# Patient Record
Sex: FEMALE | Race: AMERICAN INDIAN OR ALASKA NATIVE | NOT HISPANIC OR LATINO | Employment: FULL TIME | URBAN - METROPOLITAN AREA
[De-identification: names, ages, dates, MRNs, and addresses within clinical notes are randomized per-mention and may not be internally consistent; named-entity substitution may affect disease eponyms.]

---

## 2018-06-14 ENCOUNTER — OFFICE VISIT (OUTPATIENT)
Dept: OBSTETRICS AND GYNECOLOGY | Facility: CLINIC | Age: 63
End: 2018-06-14
Attending: OBSTETRICS & GYNECOLOGY
Payer: COMMERCIAL

## 2018-06-14 VITALS
WEIGHT: 117.19 LBS | HEIGHT: 60 IN | SYSTOLIC BLOOD PRESSURE: 154 MMHG | BODY MASS INDEX: 23.01 KG/M2 | DIASTOLIC BLOOD PRESSURE: 78 MMHG

## 2018-06-14 DIAGNOSIS — Z11.51 SCREENING FOR HPV (HUMAN PAPILLOMAVIRUS): ICD-10-CM

## 2018-06-14 DIAGNOSIS — Z01.419 ENCOUNTER FOR GYNECOLOGICAL EXAMINATION: Primary | ICD-10-CM

## 2018-06-14 DIAGNOSIS — Z12.4 ENCOUNTER FOR PAPANICOLAOU SMEAR FOR CERVICAL CANCER SCREENING: ICD-10-CM

## 2018-06-14 PROCEDURE — 99386 PREV VISIT NEW AGE 40-64: CPT | Mod: S$GLB,,, | Performed by: OBSTETRICS & GYNECOLOGY

## 2018-06-14 PROCEDURE — 99999 PR PBB SHADOW E&M-EST. PATIENT-LVL III: CPT | Mod: PBBFAC,,, | Performed by: OBSTETRICS & GYNECOLOGY

## 2018-06-14 PROCEDURE — 87624 HPV HI-RISK TYP POOLED RSLT: CPT

## 2018-06-14 PROCEDURE — 88175 CYTOPATH C/V AUTO FLUID REDO: CPT

## 2018-06-14 RX ORDER — IBUPROFEN 100 MG/5ML
1000 SUSPENSION, ORAL (FINAL DOSE FORM) ORAL DAILY
COMMUNITY
End: 2023-11-27

## 2018-06-14 RX ORDER — ERGOCALCIFEROL 1.25 MG/1
50000 CAPSULE ORAL
COMMUNITY
End: 2021-11-12

## 2018-06-14 RX ORDER — ZINC GLUCONATE 50 MG
50 TABLET ORAL DAILY
COMMUNITY
End: 2023-11-27

## 2018-06-14 RX ORDER — FERROUS GLUCONATE 324(38)MG
324 TABLET ORAL
COMMUNITY
End: 2021-11-12

## 2018-06-14 NOTE — PROGRESS NOTES
Subjective:       Patient ID: Watson Castro is a 62 y.o. female.    Chief Complaint:  Well Woman (pap 10+yrs ago mammo 2018 BMD ordered by PCP)      History of Present Illness  - here for annual. No complaints. Denies pain/vaginal bleeding.    Past Medical History:   Diagnosis Date    Iron deficiency anemia     Vitamin D deficiency        History reviewed. No pertinent surgical history.      Current Outpatient Prescriptions:     ascorbic acid, vitamin C, (VITAMIN C) 1000 MG tablet, Take 1,000 mg by mouth once daily., Disp: , Rfl:     ergocalciferol (VITAMIN D2) 50,000 unit Cap, Take 50,000 Units by mouth every 7 days., Disp: , Rfl:     ferrous gluconate (FERGON) 324 MG tablet, Take 324 mg by mouth daily with breakfast., Disp: , Rfl:     Lactobac no.41/Bifidobact no.7 (PROBIOTIC-10 ORAL), Take by mouth., Disp: , Rfl:     zinc gluconate 50 mg tablet, Take 50 mg by mouth once daily., Disp: , Rfl:     Review of patient's allergies indicates:   Allergen Reactions    Sulfa (sulfonamide antibiotics) Rash       GYN & OB History  No LMP recorded. Patient is postmenopausal.   Date of Last Pap: No result found    OB History    Para Term  AB Living   2 2 2     2   SAB TAB Ectopic Multiple Live Births           2      # Outcome Date GA Lbr Dario/2nd Weight Sex Delivery Anes PTL Lv   2 Term      Vag-Spont   CELESTINE   1 Term      Vag-Spont   CELESTINE          Social History     Social History    Marital status:      Spouse name: N/A    Number of children: N/A    Years of education: N/A     Occupational History    Not on file.     Social History Main Topics    Smoking status: Never Smoker    Smokeless tobacco: Never Used    Alcohol use No    Drug use: No    Sexual activity: Yes     Other Topics Concern    Not on file     Social History Narrative    No narrative on file       Family History   Problem Relation Age of Onset    Family history unknown: Yes       Review of Systems  Review of Systems    Respiratory: Negative for shortness of breath.    Cardiovascular: Negative for chest pain and palpitations.   Gastrointestinal: Negative for blood in stool, nausea and vomiting.   Genitourinary:        - see HPI   Skin: Negative for rash and wound.   Allergic/Immunologic: Negative for immunocompromised state.   Neurological: Negative for dizziness and syncope.   Hematological: Negative for adenopathy.   Psychiatric/Behavioral: Negative for behavioral problems.        Objective:     Vitals:    06/14/18 1306   BP: (!) 154/78   Weight: 53.1 kg (117 lb 2.8 oz)   Height: 5' (1.524 m)       Physical Exam:   Constitutional: She is oriented to person, place, and time. She appears well-developed and well-nourished.        Pulmonary/Chest: Right breast exhibits no mass, no nipple discharge, no skin change, no tenderness and no swelling. Left breast exhibits no mass, no nipple discharge, no skin change, no tenderness and no swelling. Breasts are symmetrical.       Scarring of right breast c/w old healed bug bite (per patient no change)        Abdominal: Soft. She exhibits no distension. There is no tenderness.     Genitourinary: Vagina normal and uterus normal. There is no tenderness or lesion on the right labia. There is no tenderness or lesion on the left labia. Cervix is normal. Right adnexum displays no mass, no tenderness and no fullness. Left adnexum displays no mass, no tenderness and no fullness. No vaginal discharge found. Additional cervical findings: pap smear done          Musculoskeletal: Moves all extremeties.       Neurological: She is alert and oriented to person, place, and time.     Psychiatric: She has a normal mood and affect.        Assessment/ Plan:     Orders Placed This Encounter    HPV High Risk Genotypes, PCR    Liquid-based pap smear, screening       Watson was seen today for well woman.    Diagnoses and all orders for this visit:    Encounter for gynecological examination    Encounter for  Papanicolaou smear for cervical cancer screening  -     Liquid-based pap smear, screening    Screening for HPV (human papillomavirus)  -     HPV High Risk Genotypes, PCR        Follow-up in about 1 year (around 6/14/2019) for annual exam.

## 2018-06-20 LAB
HPV HR 12 DNA CVX QL NAA+PROBE: NEGATIVE
HPV16 AG SPEC QL: NEGATIVE
HPV18 DNA SPEC QL NAA+PROBE: NEGATIVE

## 2020-11-06 PROBLEM — Z00.00 WELL ADULT EXAM: Status: ACTIVE | Noted: 2020-11-06

## 2021-02-08 PROBLEM — Z00.00 WELL ADULT EXAM: Status: RESOLVED | Noted: 2020-11-06 | Resolved: 2021-02-08

## 2021-11-12 PROBLEM — E78.1 HYPERTRIGLYCERIDEMIA: Status: ACTIVE | Noted: 2021-11-12

## 2021-11-12 PROBLEM — Z00.00 ANNUAL PHYSICAL EXAM: Status: ACTIVE | Noted: 2021-11-12

## 2022-02-14 PROBLEM — Z00.00 ANNUAL PHYSICAL EXAM: Status: RESOLVED | Noted: 2021-11-12 | Resolved: 2022-02-14

## 2022-06-01 ENCOUNTER — TELEPHONE (OUTPATIENT)
Dept: OPHTHALMOLOGY | Facility: CLINIC | Age: 67
End: 2022-06-01
Payer: COMMERCIAL

## 2022-06-01 NOTE — TELEPHONE ENCOUNTER
----- Message from Alpa Shea sent at 6/1/2022 11:59 AM CDT -----  Regarding: Appt Inquiry  Patient called to schedule with Dr for a cataract eval, pt would like to see Dr at [facility]. No solution found before the template release date of 10/31/2022. Pt is also requesting dr because her  is a pt of .        Requesting Call back number:[phone]

## 2022-06-01 NOTE — TELEPHONE ENCOUNTER
Cataract eval OU appt scheduled and confirmed with pt 07/11/2022 at 1:30 pm with Dr. Anaya. Appt confirmed for [facility] 10th floor main [place].

## 2022-09-12 ENCOUNTER — OFFICE VISIT (OUTPATIENT)
Dept: OPHTHALMOLOGY | Facility: CLINIC | Age: 67
End: 2022-09-12
Payer: COMMERCIAL

## 2022-09-12 DIAGNOSIS — H52.7 REFRACTIVE ERROR: ICD-10-CM

## 2022-09-12 DIAGNOSIS — H25.13 NUCLEAR SCLEROSIS OF BOTH EYES: Primary | ICD-10-CM

## 2022-09-12 DIAGNOSIS — H43.812 VITREOUS DETACHMENT OF LEFT EYE: ICD-10-CM

## 2022-09-12 PROCEDURE — 99213 OFFICE O/P EST LOW 20 MIN: CPT | Mod: PBBFAC,PO | Performed by: OPHTHALMOLOGY

## 2022-09-12 PROCEDURE — 92004 COMPRE OPH EXAM NEW PT 1/>: CPT | Mod: S$GLB,,, | Performed by: OPHTHALMOLOGY

## 2022-09-12 PROCEDURE — 99999 PR PBB SHADOW E&M-EST. PATIENT-LVL III: CPT | Mod: PBBFAC,,, | Performed by: OPHTHALMOLOGY

## 2022-09-12 PROCEDURE — 92136 OPHTHALMIC BIOMETRY: CPT | Mod: PBBFAC,PO,RT | Performed by: OPHTHALMOLOGY

## 2022-09-12 PROCEDURE — 99999 PR PBB SHADOW E&M-EST. PATIENT-LVL III: ICD-10-PCS | Mod: PBBFAC,,, | Performed by: OPHTHALMOLOGY

## 2022-09-12 PROCEDURE — 92004 PR EYE EXAM, NEW PATIENT,COMPREHESV: ICD-10-PCS | Mod: S$GLB,,, | Performed by: OPHTHALMOLOGY

## 2022-09-12 PROCEDURE — 92136 BIOMETRY: ICD-10-PCS | Mod: RT,S$GLB,, | Performed by: OPHTHALMOLOGY

## 2022-09-12 NOTE — PROGRESS NOTES
Subjective:       Patient ID: Watson Castro is a 67 y.o. female.    Chief Complaint: Cataract (Patient Watson Castro is a 67 year old female.)    HPI     Cataract     Additional comments: Patient Watson Castro is a 67 year old female.           Comments    Pt here for cataract evaluation OU. Pt states that her VA OD>>OS has been   increasingly blurry since NEHAL 1 year ago. Pt states that she was told by   optometrist last year that glasses will not help with VA trouble. Pt   states that she relies on her VA OS in order to see. Pt denies any glare,   pain, flashes, or floaters.    NEHAL: 1 year at [facility]    Gtts: None    POHx: None            Last edited by Delmy Mcdermott on 9/12/2022  8:39 AM.             Assessment:       1. Nuclear sclerosis of both eyes    2. Vitreous detachment of left eye    3. Refractive error          Plan:       Visually significant cataract OU -Pt. Wants Sx.     PVD OS-Stable.  RE      Cataract Surgery Consent: Patient with a visually significant cataract with difficulties of ADLs, reading, driving, night vision, glare (any and all).  Discussed with Patient/Family/Caregiver: options, risks and benefits, expectations of cataract surgery, utilized an eye model with questions and answers to facilitate discussion.  Discussed lens options and patient understands that glasses may be required for optimal vision for distance and/or near vision after cataract surgery.  The Patient/Family/Caregiver  voice good understanding and patient wishes to proceed with surgery.  The patient will likely benefit from surgery and patient signed consent for Right Eye.   Will call Pt to schedule CE OD 1st Clareon 22.0,                                             OS 2nd Clareon 22.5.

## 2022-09-16 ENCOUNTER — TELEPHONE (OUTPATIENT)
Dept: OPHTHALMOLOGY | Facility: CLINIC | Age: 67
End: 2022-09-16
Payer: COMMERCIAL

## 2022-09-16 DIAGNOSIS — H25.11 NS (NUCLEAR SCLEROSIS), RIGHT: Primary | ICD-10-CM

## 2022-09-17 DIAGNOSIS — H25.13 NUCLEAR SCLEROTIC CATARACT OF BOTH EYES: Primary | ICD-10-CM

## 2022-09-17 RX ORDER — KETOROLAC TROMETHAMINE 5 MG/ML
1 SOLUTION OPHTHALMIC 4 TIMES DAILY
Qty: 5 ML | Refills: 1 | Status: SHIPPED | OUTPATIENT
Start: 2022-10-08 | End: 2022-11-07

## 2022-09-17 RX ORDER — OFLOXACIN 3 MG/ML
1 SOLUTION/ DROPS OPHTHALMIC 4 TIMES DAILY
Qty: 5 ML | Refills: 1 | Status: SHIPPED | OUTPATIENT
Start: 2022-10-08 | End: 2022-10-18

## 2022-09-17 RX ORDER — PREDNISOLONE ACETATE 10 MG/ML
1 SUSPENSION/ DROPS OPHTHALMIC 4 TIMES DAILY
Qty: 5 ML | Refills: 1 | Status: SHIPPED | OUTPATIENT
Start: 2022-10-11 | End: 2022-11-10

## 2022-09-20 ENCOUNTER — TELEPHONE (OUTPATIENT)
Dept: OPHTHALMOLOGY | Facility: CLINIC | Age: 67
End: 2022-09-20
Payer: COMMERCIAL

## 2022-09-20 DIAGNOSIS — H25.12 NS (NUCLEAR SCLEROSIS), LEFT: Primary | ICD-10-CM

## 2022-09-21 ENCOUNTER — TELEPHONE (OUTPATIENT)
Dept: OPHTHALMOLOGY | Facility: CLINIC | Age: 67
End: 2022-09-21
Payer: COMMERCIAL

## 2022-10-04 ENCOUNTER — TELEPHONE (OUTPATIENT)
Dept: OPHTHALMOLOGY | Facility: CLINIC | Age: 67
End: 2022-10-04
Payer: COMMERCIAL

## 2022-10-04 NOTE — TELEPHONE ENCOUNTER
----- Message from Britney Aquino sent at 10/3/2022  3:54 PM CDT -----  Regarding: FW: speak with office  Contact: shadi    ----- Message -----  From: Sonia Baltazar  Sent: 10/3/2022   2:54 PM CDT  To: Jaclyn HOWARD Staff  Subject: speak with office                                Pt is calling to speak with alicja..[phone] (work)

## 2022-10-10 NOTE — H&P
[facility]  History & Physical    Subjective:      Chief Complaint/Reason for Admission:     Watson Castro is a 67 y.o. female.    Past Medical History:   Diagnosis Date    Iron deficiency anemia     Vitamin D deficiency      History reviewed. No pertinent surgical history.  Family History   Family history unknown: Yes     Social History     Tobacco Use    Smoking status: Never    Smokeless tobacco: Never   Substance Use Topics    Alcohol use: No     Alcohol/week: 0.0 standard drinks    Drug use: No       No medications prior to admission.     Review of patient's allergies indicates:   Allergen Reactions    Sulfa (sulfonamide antibiotics) Rash        Review of Systems   Eyes:  Positive for blurred vision.   All other systems reviewed and are negative.    Objective:      Vital Signs (Most Recent)       Vital Signs Range (Last 24H):       Physical Exam  Constitutional:       Appearance: She is well-developed.   HENT:      Head: Normocephalic.   Eyes:      Conjunctiva/sclera: Conjunctivae normal.      Pupils: Pupils are equal, round, and reactive to light.   Cardiovascular:      Rate and Rhythm: Normal rate and regular rhythm.      Heart sounds: Normal heart sounds.   Pulmonary:      Effort: Pulmonary effort is normal.      Breath sounds: Normal breath sounds.   Abdominal:      General: Bowel sounds are normal.      Palpations: Abdomen is soft.   Musculoskeletal:         General: Normal range of motion.      Cervical back: Normal range of motion and neck supple.   Skin:     General: Skin is warm.   Neurological:      Mental Status: She is alert and oriented to person, place, and time.       Data Review:     ECG:     Assessment:      Cataract OD.    Plan:    CE OD.

## 2022-10-11 ENCOUNTER — ANESTHESIA EVENT (OUTPATIENT)
Dept: SURGERY | Facility: OTHER | Age: 67
End: 2022-10-11

## 2022-10-11 ENCOUNTER — HOSPITAL ENCOUNTER (OUTPATIENT)
Facility: OTHER | Age: 67
Discharge: HOME | End: 2022-10-11
Attending: OPHTHALMOLOGY | Admitting: OPHTHALMOLOGY
Payer: COMMERCIAL

## 2022-10-11 ENCOUNTER — ANESTHESIA (OUTPATIENT)
Dept: SURGERY | Facility: OTHER | Age: 67
End: 2022-10-11

## 2022-10-11 VITALS
RESPIRATION RATE: 16 BRPM | BODY MASS INDEX: 22.58 KG/M2 | TEMPERATURE: 99 F | SYSTOLIC BLOOD PRESSURE: 113 MMHG | DIASTOLIC BLOOD PRESSURE: 72 MMHG | OXYGEN SATURATION: 99 % | HEIGHT: 60 IN | WEIGHT: 115 LBS | HEART RATE: 89 BPM

## 2022-10-11 DIAGNOSIS — H25.11 NUCLEAR SCLEROTIC CATARACT OF RIGHT EYE: Primary | ICD-10-CM

## 2022-10-11 PROCEDURE — 36000706: Performed by: OPHTHALMOLOGY

## 2022-10-11 PROCEDURE — 25000003 PHARM REV CODE 250: Performed by: OPHTHALMOLOGY

## 2022-10-11 PROCEDURE — 36000707: Performed by: OPHTHALMOLOGY

## 2022-10-11 PROCEDURE — 37000008 HC ANESTHESIA 1ST 15 MINUTES: Performed by: OPHTHALMOLOGY

## 2022-10-11 PROCEDURE — 66984 XCAPSL CTRC RMVL W/O ECP: CPT | Mod: RT,,, | Performed by: OPHTHALMOLOGY

## 2022-10-11 PROCEDURE — 66984 PR REMOVAL, CATARACT, W/INSRT INTRAOC LENS, W/O ENDO CYCLO: ICD-10-PCS | Mod: RT,,, | Performed by: OPHTHALMOLOGY

## 2022-10-11 PROCEDURE — 71000015 HC POSTOP RECOV 1ST HR: Performed by: OPHTHALMOLOGY

## 2022-10-11 PROCEDURE — 63600175 PHARM REV CODE 636 W HCPCS: Performed by: NURSE ANESTHETIST, CERTIFIED REGISTERED

## 2022-10-11 PROCEDURE — 37000009 HC ANESTHESIA EA ADD 15 MINS: Performed by: OPHTHALMOLOGY

## 2022-10-11 PROCEDURE — 63600175 PHARM REV CODE 636 W HCPCS: Performed by: OPHTHALMOLOGY

## 2022-10-11 PROCEDURE — V2632 POST CHMBR INTRAOCULAR LENS: HCPCS | Performed by: OPHTHALMOLOGY

## 2022-10-11 DEVICE — LENS CLAREON AUTONOME 22.0D: Type: IMPLANTABLE DEVICE | Site: EYE | Status: FUNCTIONAL

## 2022-10-11 RX ORDER — PHENYLEPHRINE HYDROCHLORIDE 25 MG/ML
1 SOLUTION/ DROPS OPHTHALMIC
Status: COMPLETED | OUTPATIENT
Start: 2022-10-11 | End: 2022-10-11

## 2022-10-11 RX ORDER — OFLOXACIN 3 MG/ML
1 SOLUTION/ DROPS OPHTHALMIC
Status: DISCONTINUED | OUTPATIENT
Start: 2022-10-11 | End: 2022-10-11 | Stop reason: HOSPADM

## 2022-10-11 RX ORDER — TETRACAINE HYDROCHLORIDE 5 MG/ML
1 SOLUTION OPHTHALMIC
Status: DISCONTINUED | OUTPATIENT
Start: 2022-10-11 | End: 2022-10-11 | Stop reason: HOSPADM

## 2022-10-11 RX ORDER — SODIUM CHLORIDE 0.9 % (FLUSH) 0.9 %
10 SYRINGE (ML) INJECTION
Status: DISCONTINUED | OUTPATIENT
Start: 2022-10-11 | End: 2022-10-11 | Stop reason: HOSPADM

## 2022-10-11 RX ORDER — TETRACAINE HYDROCHLORIDE 5 MG/ML
SOLUTION OPHTHALMIC
Status: DISCONTINUED | OUTPATIENT
Start: 2022-10-11 | End: 2022-10-11 | Stop reason: HOSPADM

## 2022-10-11 RX ORDER — LIDOCAINE HYDROCHLORIDE 40 MG/ML
INJECTION, SOLUTION RETROBULBAR
Status: DISCONTINUED | OUTPATIENT
Start: 2022-10-11 | End: 2022-10-11 | Stop reason: HOSPADM

## 2022-10-11 RX ORDER — HYDROCODONE BITARTRATE AND ACETAMINOPHEN 5; 325 MG/1; MG/1
1 TABLET ORAL EVERY 4 HOURS PRN
Status: DISCONTINUED | OUTPATIENT
Start: 2022-10-11 | End: 2022-10-11 | Stop reason: HOSPADM

## 2022-10-11 RX ORDER — EPINEPHRINE 1 MG/ML
INJECTION, SOLUTION INTRACARDIAC; INTRAMUSCULAR; INTRAVENOUS; SUBCUTANEOUS
Status: DISCONTINUED | OUTPATIENT
Start: 2022-10-11 | End: 2022-10-11 | Stop reason: HOSPADM

## 2022-10-11 RX ORDER — TROPICAMIDE 10 MG/ML
1 SOLUTION/ DROPS OPHTHALMIC
Status: COMPLETED | OUTPATIENT
Start: 2022-10-11 | End: 2022-10-11

## 2022-10-11 RX ORDER — ACETAMINOPHEN 325 MG/1
650 TABLET ORAL EVERY 4 HOURS PRN
Status: DISCONTINUED | OUTPATIENT
Start: 2022-10-11 | End: 2022-10-11 | Stop reason: HOSPADM

## 2022-10-11 RX ORDER — CYCLOPENTOLATE HYDROCHLORIDE 10 MG/ML
1 SOLUTION/ DROPS OPHTHALMIC
Status: DISCONTINUED | OUTPATIENT
Start: 2022-10-11 | End: 2022-10-11 | Stop reason: HOSPADM

## 2022-10-11 RX ORDER — MIDAZOLAM HYDROCHLORIDE 1 MG/ML
INJECTION INTRAMUSCULAR; INTRAVENOUS
Status: DISCONTINUED | OUTPATIENT
Start: 2022-10-11 | End: 2022-10-11

## 2022-10-11 RX ORDER — PREDNISOLONE ACETATE 10 MG/ML
SUSPENSION/ DROPS OPHTHALMIC
Status: DISCONTINUED | OUTPATIENT
Start: 2022-10-11 | End: 2022-10-11 | Stop reason: HOSPADM

## 2022-10-11 RX ADMIN — TROPICAMIDE 1 DROP: 10 SOLUTION/ DROPS OPHTHALMIC at 07:10

## 2022-10-11 RX ADMIN — PHENYLEPHRINE HYDROCHLORIDE 1 DROP: 25 SOLUTION/ DROPS OPHTHALMIC at 07:10

## 2022-10-11 RX ADMIN — MIDAZOLAM HYDROCHLORIDE 2 MG: 1 INJECTION, SOLUTION INTRAMUSCULAR; INTRAVENOUS at 10:10

## 2022-10-11 RX ADMIN — OFLOXACIN 1 DROP: 3 SOLUTION OPHTHALMIC at 07:10

## 2022-10-11 RX ADMIN — PHENYLEPHRINE HYDROCHLORIDE 1 DROP: 25 SOLUTION/ DROPS OPHTHALMIC at 08:10

## 2022-10-11 RX ADMIN — TETRACAINE HYDROCHLORIDE 1 DROP: 5 SOLUTION OPHTHALMIC at 08:10

## 2022-10-11 RX ADMIN — CYCLOPENTOLATE HYDROCHLORIDE 1 DROP: 10 SOLUTION/ DROPS OPHTHALMIC at 07:10

## 2022-10-11 RX ADMIN — CYCLOPENTOLATE HYDROCHLORIDE 1 DROP: 10 SOLUTION/ DROPS OPHTHALMIC at 08:10

## 2022-10-11 RX ADMIN — OFLOXACIN 1 DROP: 3 SOLUTION OPHTHALMIC at 08:10

## 2022-10-11 NOTE — DISCHARGE INSTRUCTIONS
Dr. Anaya     Post op Cataract instructions:     Relax at home do not exert yourself for the rest of the day.   Resume taking all your medications after surgery.   Do NOT rub your eye   Do not allow water into the eye and do not wear eye make up for one week after surgery.   Wear eye shield at bedtime for 1 week.   You may take an over-the-counter pain medication such as Tylenol or Advil as needed for pain.   Mild discomfort is normal. However, if you have severe throbbing pain, loss of vision, onset of flashes and/or floaters call Dr. Anaya at   [phone] or proceed to the Emergency room   You may resume driving the day after your procedure to your postop appointment if your vision is clear. Do not wear your eye shield while driving.   Plan to see Dr. Anaya tomorrow at his office :     [address] [facility].   [phone]     Day of surgery:   Start your eye drops in the operative eye as soon as you get home and continue until Dr. Anaya instructs you otherwise. (remove eye shield to instill drops)   Ofloxacin (tan top) 1 drop in operative eye 3 times a day   Durezol (pink top) 1 drop in operative eye 3 times a day.   Ilevro (gray top) 1 drop in operative eye once a day

## 2022-10-11 NOTE — OP NOTE
Operative Date:  10/11/2022    Discharge Date:  10/11/2022    Discharge Patient Home    Report Title: Operative Note      SURGEON: Shai Anaya MD     ASSISTANT:     PREOPERATIVE DIAGNOSIS: Visually significant NSC cataract,  Right Eye.    POSTOPERATIVE DIAGNOSIS: Visually-significant NSC cataract,  Right Eye.    PROCEDURE PERFORMED: Phacoemulsification of the cataract with posterior chamber intraocular lens Right Eye.    ANESTHESIA: Topical with MAC     COMPLICATIONS: None    ESTIMATED BLOOD LOSS: Minimal    INDICATIONS FOR PROCEDURE:   The patient is a pleasant 67 year old woman with increasing difficulties with activities of daily living secondary to a dense visually significant cataract in the Right Eye.  Discussions have been carried out with this patient concerning the options to surgery, risks, benefits and expectations.  The patient voiced good understanding and wished to proceed with the above procedure.    PROCEDURE IN DETAIL: The patient was brought to the operating room and received topical anesthetic to the eye and then was prepped and draped in the usual sterile fashion.  Using the Henao ring and the guarded ang blade set at 0.37 mm, a partial thickness clear cornea incision was made temporally.  The paracentesis site was made at the twelve o'clock position.  Omidria was injected into the anterior chamber through the paracentesis.  Viscoat was then injected into the anterior chamber.  The eye was then reentered at the primary surgical site with a 2.4 mm keratome followed by continuous capsulotomy, hydrodissection, hydrodelineation and phacoemulsification of the cataract.  Residual cortical material was removed using automated irrigation-aspiration technique.  Healon was injected into the posterior chamber and a Clareon 22.0 diopter lens was placed in the bag without difficulty. Residual viscoelastic was removed using automated irrigation-aspiration technique. The eye was  re-pressurized using BSS solution and both the paracentesis site and the primary surgical site were demonstrated to be watertight at the end of the case with Weck--Miriam manipulation.  One drop of Ofloxacin and one drop of Pred acetate 1% was applied to the Right Eye .The eye was closed, patched and a Wallace shield placed.  The patient was taken to the recovery room in good and stable condition.  The patient tolerated the procedure well.  The patient was instructed to refrain from any heavy lifting, bending, stooping or straining activities, discharged home  and to follow-up in the morning for routine postoperative care with Shai Anaya MD.

## 2022-10-11 NOTE — ANESTHESIA PREPROCEDURE EVALUATION
10/11/2022  Watson Castro is a 67 y.o., female.      Pre-op Assessment    I have reviewed the Patient Summary Reports.     I have reviewed the Nursing Notes. I have reviewed the NPO Status.   I have reviewed the Medications.     Review of Systems  Anesthesia Hx:  No problems with previous Anesthesia  Denies Family Hx of Anesthesia complications.   Denies Personal Hx of Anesthesia complications.   Hematology/Oncology:  Hematology Normal   Oncology Normal     EENT/Dental:EENT/Dental Normal   Cardiovascular:  Cardiovascular Normal     Pulmonary:  Pulmonary Normal    Renal/:  Renal/ Normal     Hepatic/GI:  Hepatic/GI Normal    Musculoskeletal:  Musculoskeletal Normal    Neurological:  Neurology Normal    Endocrine:  Endocrine Normal    Dermatological:  Skin Normal    Psych:  Psychiatric Normal           Physical Exam  General: Well nourished and Alert    Airway:  Mallampati: II   Mouth Opening: Normal  Tongue: Normal    Dental:  Intact        Anesthesia Plan  Type of Anesthesia, risks & benefits discussed:    Anesthesia Type: MAC  Intra-op Monitoring Plan: Standard ASA Monitors  Informed Consent: Informed consent signed with the Patient and all parties understand the risks and agree with anesthesia plan.  All questions answered.   ASA Score: 2    Ready For Surgery From Anesthesia Perspective.     .

## 2022-10-11 NOTE — BRIEF OP NOTE
[facility] - Surgery ([place])  Brief Operative Note    Surgery Date: 10/11/2022     Surgeon(s) and Role:     * Shai Anaya MD - Primary    Assisting Surgeon: None    Pre-op Diagnosis:  NS (nuclear sclerosis), right [H25.11]    Post-op Diagnosis:  Post-Op Diagnosis Codes:     * NS (nuclear sclerosis), right [H25.11]    Procedure(s) (LRB):  EXTRACTION, CATARACT, WITH IOL INSERTION (Right)    Anesthesia: Local MAC    Operative Findings:     Estimated Blood Loss: * No values recorded between 10/11/2022 10:18 AM and 10/11/2022 10:58 AM *         Specimens:   Specimen (24h ago, onward)      None              Discharge Note    OUTCOME: Patient tolerated treatment/procedure well without complication and is now ready for discharge.    DISPOSITION: Home or Self Care    FINAL DIAGNOSIS:  Nuclear sclerotic cataract of right eye    FOLLOWUP: In clinic    DISCHARGE INSTRUCTIONS:    Discharge Procedure Orders   Other restrictions (specify):   Order Comments: No heavy lifting or bending for 1 week.

## 2022-10-11 NOTE — ANESTHESIA POSTPROCEDURE EVALUATION
Anesthesia Post Evaluation    Patient: Watson Castro    Procedure(s) Performed: Procedure(s) (LRB):  EXTRACTION, CATARACT, WITH IOL INSERTION (Right)    Final Anesthesia Type: MAC      Patient location during evaluation: [facility]  Patient participation: Yes- Able to Participate  Level of consciousness: awake and alert  Post-procedure vital signs: reviewed and stable  Pain management: adequate  Airway patency: patent    PONV status at discharge: No PONV  Anesthetic complications: no      Cardiovascular status: blood pressure returned to baseline  Respiratory status: unassisted and spontaneous ventilation  Hydration status: euvolemic  Follow-up not needed.          Vitals Value Taken Time   /69 10/11/22 0754   Temp 37 °C (98.6 °F) 10/11/22 0754   Pulse 97 10/11/22 0754   Resp 16 10/11/22 0754   SpO2 97 % 10/11/22 0754         No case tracking events are documented in the log.      Pain/Marlene Score: No data recorded

## 2022-10-11 NOTE — PLAN OF CARE
Watson Nicolase has met all discharge criteria from Phase II. Vital Signs are stable, ambulating  without difficulty. Discharge instructions given, patient verbalized understanding. Discharged from facility via wheelchair in stable condition.

## 2022-10-12 ENCOUNTER — TELEPHONE (OUTPATIENT)
Dept: OPHTHALMOLOGY | Facility: CLINIC | Age: 67
End: 2022-10-12
Payer: COMMERCIAL

## 2022-10-12 ENCOUNTER — OFFICE VISIT (OUTPATIENT)
Dept: OPHTHALMOLOGY | Facility: CLINIC | Age: 67
End: 2022-10-12
Payer: COMMERCIAL

## 2022-10-12 DIAGNOSIS — Z98.890 POST-OPERATIVE STATE: Primary | ICD-10-CM

## 2022-10-12 PROCEDURE — 99024 POSTOP FOLLOW-UP VISIT: CPT | Mod: S$GLB,,, | Performed by: OPHTHALMOLOGY

## 2022-10-12 PROCEDURE — 99999 PR PBB SHADOW E&M-EST. PATIENT-LVL III: ICD-10-PCS | Mod: PBBFAC,,, | Performed by: OPHTHALMOLOGY

## 2022-10-12 PROCEDURE — 99999 PR PBB SHADOW E&M-EST. PATIENT-LVL III: CPT | Mod: PBBFAC,,, | Performed by: OPHTHALMOLOGY

## 2022-10-12 PROCEDURE — 99024 PR POST-OP FOLLOW-UP VISIT: ICD-10-PCS | Mod: S$GLB,,, | Performed by: OPHTHALMOLOGY

## 2022-10-12 NOTE — PROGRESS NOTES
Subjective:       Patient ID: Watson Castro is a 67 y.o. female.    Chief Complaint: Post-op Evaluation (Patient Watson Castro is a 67 year old female.)    HPI     Post-op Evaluation     Additional comments: Patient Watson Castro is a 67 year old female.           Comments    Pt here for 1 day PCIOL OD post-op visit. Pt denies any eye pain or   headache since sx yesterday. Post-op instructions reviewed with pt.    DLS: 09/12/2022 with Dr. Anaya  S/p Phaco w/IOL OD: 10/11/2022  *2nd eye PCIOL OS scheduled 10/25/2022.*    Gtts:  1. Ofloxacin QID OD  2. Pred Forte QID OD  3. Ketorolac QID OD          Last edited by Delmy Mcdermott on 10/12/2022  9:55 AM.             Assessment:       1. Post-operative state          Plan:       S/p CE OD- Doing well.         C PM OD.  RTC 1 wk.

## 2022-10-14 ENCOUNTER — TELEPHONE (OUTPATIENT)
Dept: OPHTHALMOLOGY | Facility: CLINIC | Age: 67
End: 2022-10-14
Payer: COMMERCIAL

## 2022-10-18 ENCOUNTER — TELEPHONE (OUTPATIENT)
Dept: OPHTHALMOLOGY | Facility: CLINIC | Age: 67
End: 2022-10-18
Payer: COMMERCIAL

## 2022-10-19 ENCOUNTER — OFFICE VISIT (OUTPATIENT)
Dept: OPHTHALMOLOGY | Facility: CLINIC | Age: 67
End: 2022-10-19
Payer: COMMERCIAL

## 2022-10-19 DIAGNOSIS — H25.12 NS (NUCLEAR SCLEROSIS), LEFT: ICD-10-CM

## 2022-10-19 DIAGNOSIS — Z98.890 POST-OPERATIVE STATE: Primary | ICD-10-CM

## 2022-10-19 PROCEDURE — 99999 PR PBB SHADOW E&M-EST. PATIENT-LVL III: ICD-10-PCS | Mod: PBBFAC,,, | Performed by: OPHTHALMOLOGY

## 2022-10-19 PROCEDURE — 92136 PR OPHTHAL BIOMETRY,INTRAOC LENS POW CALC: ICD-10-PCS | Mod: 26,LT,S$GLB, | Performed by: OPHTHALMOLOGY

## 2022-10-19 PROCEDURE — 99999 PR PBB SHADOW E&M-EST. PATIENT-LVL III: CPT | Mod: PBBFAC,,, | Performed by: OPHTHALMOLOGY

## 2022-10-19 PROCEDURE — 99024 PR POST-OP FOLLOW-UP VISIT: ICD-10-PCS | Mod: S$GLB,,, | Performed by: OPHTHALMOLOGY

## 2022-10-19 PROCEDURE — 99024 POSTOP FOLLOW-UP VISIT: CPT | Mod: S$GLB,,, | Performed by: OPHTHALMOLOGY

## 2022-10-19 PROCEDURE — 92136 OPHTHALMIC BIOMETRY: CPT | Mod: 26,LT,S$GLB, | Performed by: OPHTHALMOLOGY

## 2022-10-19 NOTE — PROGRESS NOTES
Subjective:       Patient ID: Watson Castro is a 67 y.o. female.    Chief Complaint: Post-op Evaluation (Watson Castro is a 66 y/o female )    HPI     Post-op Evaluation     Additional comments: Watson Castro is a 66 y/o female            Comments    S/p Phaco w/IOL OD: 10/11/2022     Pt here for 1 week PCIOL OD  Pt denies eye pain. Pt denies f/f.  No other complaints at this time.    Pred Forte TID OD   Ketorolac TID OD            Last edited by Britney Aquino on 10/19/2022  9:21 AM.             Assessment:       1. Post-operative state    2. NS (nuclear sclerosis), left          Plan:       S/p CE OD- Doing well.     Visually significant cataract OS -Pt. Wants Sx.       Taper gtts OD.  Cataract Surgery Consent: Patient with a visually significant cataract with difficulties of ADLs, reading, driving, night vision, glare (any and all).  Discussed with Patient/Family/Caregiver: options, risks and benefits, expectations of cataract surgery, utilized an eye model with questions and answers to facilitate discussion.  Discussed lens options and patient understands that glasses may be required for optimal vision for distance and/or near vision after cataract surgery.  The Patient/Family/Caregiver  voice good understanding and patient wishes to proceed with surgery.  The patient will likely benefit from surgery and patient signed consent for Left Eye.   CE OS 10/25/22.

## 2022-10-23 NOTE — H&P
[facility] Surgery ([place])  History & Physical    Subjective:      Chief Complaint/Reason for Admission:     Watson Castro is a 67 y.o. female.    Past Medical History:   Diagnosis Date    Iron deficiency anemia     Vitamin D deficiency      Past Surgical History:   Procedure Laterality Date    CATARACT EXTRACTION W/  INTRAOCULAR LENS IMPLANT Right 10/11/2022    Procedure: EXTRACTION, CATARACT, WITH IOL INSERTION;  Surgeon: Shai Anaya MD;  Location: [facility] OR;  Service: Ophthalmology;  Laterality: Right;     Family History   Family history unknown: Yes     Social History     Tobacco Use    Smoking status: Never    Smokeless tobacco: Never   Substance Use Topics    Alcohol use: No     Alcohol/week: 0.0 standard drinks    Drug use: No       No medications prior to admission.     Review of patient's allergies indicates:   Allergen Reactions    Sulfa (sulfonamide antibiotics) Rash        Review of Systems   Eyes:  Positive for blurred vision.   All other systems reviewed and are negative.    Objective:      Vital Signs (Most Recent)       Vital Signs Range (Last 24H):       Physical Exam  Constitutional:       Appearance: She is well-developed.   HENT:      Head: Normocephalic.   Eyes:      Conjunctiva/sclera: Conjunctivae normal.      Pupils: Pupils are equal, round, and reactive to light.   Cardiovascular:      Rate and Rhythm: Normal rate and regular rhythm.      Heart sounds: Normal heart sounds.   Pulmonary:      Effort: Pulmonary effort is normal.      Breath sounds: Normal breath sounds.   Abdominal:      General: Bowel sounds are normal.      Palpations: Abdomen is soft.   Musculoskeletal:         General: Normal range of motion.      Cervical back: Normal range of motion and neck supple.   Skin:     General: Skin is warm.   Neurological:      Mental Status: She is alert and oriented to person, place, and time.       Data Review:     ECG:    Assessment:      Cataract OS.    Plan:    CE OS.

## 2022-10-25 ENCOUNTER — ANESTHESIA (OUTPATIENT)
Dept: SURGERY | Facility: OTHER | Age: 67
End: 2022-10-25
Payer: COMMERCIAL

## 2022-10-25 ENCOUNTER — ANESTHESIA EVENT (OUTPATIENT)
Dept: SURGERY | Facility: OTHER | Age: 67
End: 2022-10-25
Payer: COMMERCIAL

## 2022-10-25 ENCOUNTER — HOSPITAL ENCOUNTER (OUTPATIENT)
Facility: OTHER | Age: 67
Discharge: HOME | End: 2022-10-25
Attending: OPHTHALMOLOGY | Admitting: OPHTHALMOLOGY
Payer: COMMERCIAL

## 2022-10-25 VITALS
TEMPERATURE: 98 F | WEIGHT: 115 LBS | BODY MASS INDEX: 22.58 KG/M2 | HEIGHT: 60 IN | SYSTOLIC BLOOD PRESSURE: 119 MMHG | OXYGEN SATURATION: 100 % | DIASTOLIC BLOOD PRESSURE: 79 MMHG | HEART RATE: 99 BPM | RESPIRATION RATE: 17 BRPM

## 2022-10-25 DIAGNOSIS — H25.12 NUCLEAR SCLEROTIC CATARACT OF LEFT EYE: Primary | ICD-10-CM

## 2022-10-25 PROCEDURE — 37000009 HC ANESTHESIA EA ADD 15 MINS: Performed by: OPHTHALMOLOGY

## 2022-10-25 PROCEDURE — 66984 XCAPSL CTRC RMVL W/O ECP: CPT | Mod: 79,LT,, | Performed by: OPHTHALMOLOGY

## 2022-10-25 PROCEDURE — 63600175 PHARM REV CODE 636 W HCPCS: Performed by: OPHTHALMOLOGY

## 2022-10-25 PROCEDURE — 36000707: Performed by: OPHTHALMOLOGY

## 2022-10-25 PROCEDURE — 37000008 HC ANESTHESIA 1ST 15 MINUTES: Performed by: OPHTHALMOLOGY

## 2022-10-25 PROCEDURE — 63600175 PHARM REV CODE 636 W HCPCS: Performed by: NURSE ANESTHETIST, CERTIFIED REGISTERED

## 2022-10-25 PROCEDURE — 71000016 HC POSTOP RECOV ADDL HR: Performed by: OPHTHALMOLOGY

## 2022-10-25 PROCEDURE — 25000003 PHARM REV CODE 250: Performed by: OPHTHALMOLOGY

## 2022-10-25 PROCEDURE — 71000015 HC POSTOP RECOV 1ST HR: Performed by: OPHTHALMOLOGY

## 2022-10-25 PROCEDURE — 66984 PR REMOVAL, CATARACT, W/INSRT INTRAOC LENS, W/O ENDO CYCLO: ICD-10-PCS | Mod: 79,LT,, | Performed by: OPHTHALMOLOGY

## 2022-10-25 PROCEDURE — 36000706: Performed by: OPHTHALMOLOGY

## 2022-10-25 PROCEDURE — V2632 POST CHMBR INTRAOCULAR LENS: HCPCS | Performed by: OPHTHALMOLOGY

## 2022-10-25 DEVICE — LENS CLAREON AUTONOME 22.5D: Type: IMPLANTABLE DEVICE | Site: EYE | Status: FUNCTIONAL

## 2022-10-25 RX ORDER — TETRACAINE HYDROCHLORIDE 5 MG/ML
1 SOLUTION OPHTHALMIC
Status: COMPLETED | OUTPATIENT
Start: 2022-10-25 | End: 2022-10-25

## 2022-10-25 RX ORDER — OFLOXACIN 3 MG/ML
1 SOLUTION/ DROPS OPHTHALMIC
Status: DISCONTINUED | OUTPATIENT
Start: 2022-10-25 | End: 2022-10-25 | Stop reason: HOSPADM

## 2022-10-25 RX ORDER — ACETAMINOPHEN 325 MG/1
650 TABLET ORAL EVERY 4 HOURS PRN
Status: CANCELLED | OUTPATIENT
Start: 2022-10-25

## 2022-10-25 RX ORDER — CYCLOPENTOLATE HYDROCHLORIDE 10 MG/ML
1 SOLUTION/ DROPS OPHTHALMIC
Status: DISCONTINUED | OUTPATIENT
Start: 2022-10-25 | End: 2022-10-25 | Stop reason: HOSPADM

## 2022-10-25 RX ORDER — HYDROCODONE BITARTRATE AND ACETAMINOPHEN 5; 325 MG/1; MG/1
1 TABLET ORAL EVERY 4 HOURS PRN
Status: CANCELLED | OUTPATIENT
Start: 2022-10-25

## 2022-10-25 RX ORDER — MIDAZOLAM HYDROCHLORIDE 1 MG/ML
INJECTION INTRAMUSCULAR; INTRAVENOUS
Status: DISCONTINUED | OUTPATIENT
Start: 2022-10-25 | End: 2022-10-25

## 2022-10-25 RX ORDER — LIDOCAINE HYDROCHLORIDE 40 MG/ML
INJECTION, SOLUTION RETROBULBAR
Status: DISCONTINUED | OUTPATIENT
Start: 2022-10-25 | End: 2022-10-25 | Stop reason: HOSPADM

## 2022-10-25 RX ORDER — TROPICAMIDE 10 MG/ML
1 SOLUTION/ DROPS OPHTHALMIC
Status: COMPLETED | OUTPATIENT
Start: 2022-10-25 | End: 2022-10-25

## 2022-10-25 RX ORDER — PREDNISOLONE ACETATE 10 MG/ML
SUSPENSION/ DROPS OPHTHALMIC
Status: DISCONTINUED | OUTPATIENT
Start: 2022-10-25 | End: 2022-10-25 | Stop reason: HOSPADM

## 2022-10-25 RX ORDER — SODIUM CHLORIDE 0.9 % (FLUSH) 0.9 %
10 SYRINGE (ML) INJECTION
Status: DISCONTINUED | OUTPATIENT
Start: 2022-10-25 | End: 2022-10-25 | Stop reason: HOSPADM

## 2022-10-25 RX ORDER — EPINEPHRINE 1 MG/ML
INJECTION, SOLUTION, CONCENTRATE INTRAVENOUS
Status: DISCONTINUED | OUTPATIENT
Start: 2022-10-25 | End: 2022-10-25 | Stop reason: HOSPADM

## 2022-10-25 RX ORDER — PHENYLEPHRINE HYDROCHLORIDE 25 MG/ML
1 SOLUTION/ DROPS OPHTHALMIC
Status: COMPLETED | OUTPATIENT
Start: 2022-10-25 | End: 2022-10-25

## 2022-10-25 RX ADMIN — TETRACAINE HYDROCHLORIDE 1 DROP: 5 SOLUTION OPHTHALMIC at 07:10

## 2022-10-25 RX ADMIN — OFLOXACIN 1 DROP: 3 SOLUTION OPHTHALMIC at 07:10

## 2022-10-25 RX ADMIN — TROPICAMIDE 1 DROP: 10 SOLUTION/ DROPS OPHTHALMIC at 07:10

## 2022-10-25 RX ADMIN — PHENYLEPHRINE HYDROCHLORIDE 1 DROP: 25 SOLUTION/ DROPS OPHTHALMIC at 07:10

## 2022-10-25 RX ADMIN — CYCLOPENTOLATE HYDROCHLORIDE 1 DROP: 10 SOLUTION/ DROPS OPHTHALMIC at 07:10

## 2022-10-25 RX ADMIN — MIDAZOLAM HYDROCHLORIDE 2 MG: 1 INJECTION, SOLUTION INTRAMUSCULAR; INTRAVENOUS at 09:10

## 2022-10-25 NOTE — ANESTHESIA PREPROCEDURE EVALUATION
10/25/2022  Watson Castro is a 67 y.o., female.      Pre-op Assessment    I have reviewed the Patient Summary Reports.     I have reviewed the Nursing Notes. I have reviewed the NPO Status.   I have reviewed the Medications.     Review of Systems  Anesthesia Hx:  No problems with previous Anesthesia  Denies Family Hx of Anesthesia complications.   Denies Personal Hx of Anesthesia complications.   Hematology/Oncology:  Hematology Normal   Oncology Normal     EENT/Dental:EENT/Dental Normal   Cardiovascular:  Cardiovascular Normal     Pulmonary:  Pulmonary Normal    Renal/:  Renal/ Normal     Hepatic/GI:  Hepatic/GI Normal    Musculoskeletal:  Musculoskeletal Normal    Neurological:  Neurology Normal    Endocrine:  Endocrine Normal    Dermatological:  Skin Normal    Psych:  Psychiatric Normal           Physical Exam  General: Well nourished and Alert    Airway:  Mallampati: II   Mouth Opening: Normal  Tongue: Normal    Dental:  Intact        Anesthesia Plan  Type of Anesthesia, risks & benefits discussed:    Anesthesia Type: MAC  Intra-op Monitoring Plan: Standard ASA Monitors  Informed Consent: Informed consent signed with the Patient and all parties understand the risks and agree with anesthesia plan.  All questions answered.   ASA Score: 2    Ready For Surgery From Anesthesia Perspective.     .

## 2022-10-25 NOTE — BRIEF OP NOTE
[facility] - Surgery ([place])  Brief Operative Note    Surgery Date: 10/25/2022     Surgeon(s) and Role:     * Shai Anaya MD - Primary    Assisting Surgeon: None    Pre-op Diagnosis:  NS (nuclear sclerosis), left [H25.12]    Post-op Diagnosis:  Post-Op Diagnosis Codes:     * NS (nuclear sclerosis), left [H25.12]    Procedure(s) (LRB):  EXTRACTION, CATARACT, WITH IOL INSERTION (Left)    Anesthesia: Local MAC    Operative Findings:     Estimated Blood Loss: * No values recorded between 10/25/2022  9:10 AM and 10/25/2022  9:41 AM *         Specimens:   Specimen (24h ago, onward)      None              Discharge Note    OUTCOME: Patient tolerated treatment/procedure well without complication and is now ready for discharge.    DISPOSITION: Home or Self Care    FINAL DIAGNOSIS:  Nuclear sclerotic cataract of left eye    FOLLOWUP: In clinic    DISCHARGE INSTRUCTIONS:    Discharge Procedure Orders   Other restrictions (specify):   Order Comments: No heavy lifting or bending for 1 week.

## 2022-10-25 NOTE — ANESTHESIA POSTPROCEDURE EVALUATION
Anesthesia Post Evaluation    Patient: Watson Castro    Procedure(s) Performed: Procedure(s) (LRB):  EXTRACTION, CATARACT, WITH IOL INSERTION (Left)    Final Anesthesia Type: MAC      Patient location during evaluation: [facility]  Patient participation: Yes- Able to Participate  Level of consciousness: awake and alert  Post-procedure vital signs: reviewed and stable  Pain management: adequate  Airway patency: patent    PONV status at discharge: No PONV  Anesthetic complications: no      Cardiovascular status: blood pressure returned to baseline  Respiratory status: unassisted and spontaneous ventilation  Hydration status: euvolemic  Follow-up not needed.          Vitals Value Taken Time   /78 10/25/22 0726   Temp 36.8 °C (98.2 °F) 10/25/22 0726   Pulse 78 10/25/22 0726   Resp 18 10/25/22 0726   SpO2 97 % 10/25/22 0726         No case tracking events are documented in the log.      Pain/Marlene Score: No data recorded

## 2022-10-25 NOTE — OP NOTE
Operative Date:  10/25/2022    Discharge Date:  10/25/2022    Discharge Patient Home    Report Title: Operative Note      SURGEON: Shai Anaya MD     ASSISTANT:     PREOPERATIVE DIAGNOSIS: Visually significant NSC cataract,  Left Eye.    POSTOPERATIVE DIAGNOSIS: Visually-significant NSC cataract,  Left Eye.    PROCEDURE PERFORMED: Phacoemulsification of the cataract with posterior chamber intraocular lens Left Eye.    ANESTHESIA: Topical with MAC     COMPLICATIONS: None    ESTIMATED BLOOD LOSS: Minimal    INDICATIONS FOR PROCEDURE:   The patient is a pleasant 67 year old woman with increasing difficulties with activities of daily living secondary to a dense visually significant cataract in the Left Eye.  Discussions have been carried out with this patient concerning the options to surgery, risks, benefits and expectations.  The patient voiced good understanding and wished to proceed with the above procedure.    PROCEDURE IN DETAIL: The patient was brought to the operating room and received topical anesthetic to the eye and then was prepped and draped in the usual sterile fashion.  Using the Henao ring and the guarded ang blade set at 0.37 mm, a partial thickness clear cornea incision was made temporally.  The paracentesis site was made at the six o'clock position.  Omidria was injected into the anterior chamber through the paracentesis.  Viscoat was then injected into the anterior chamber.  The eye was then reentered at the primary surgical site with a 2.4 mm keratome followed by continuous capsulotomy, hydrodissection, hydrodelineation and phacoemulsification of the cataract.  Residual cortical material was removed using automated irrigation-aspiration technique.  Healon was injected into the posterior chamber and a CNAOTO 22.5 diopter lens was placed in the bag without difficulty. Residual viscoelastic was removed using automated irrigation-aspiration technique. The eye was re-pressurized using  BSS solution and both the paracentesis site and the primary surgical site were demonstrated to be watertight at the end of the case with Weck--Miriam manipulation.  One drop of Ofloxacin and one drop of Pred acetate 1% was applied to the Left Eye .The eye was closed, patched and a Wallace shield placed.  The patient was taken to the recovery room in good and stable condition.  The patient tolerated the procedure well.  The patient was instructed to refrain from any heavy lifting, bending, stooping or straining activities, discharged home  and to follow-up in the morning for routine postoperative care with Shai Anaya MD.

## 2022-10-26 ENCOUNTER — OFFICE VISIT (OUTPATIENT)
Dept: OPHTHALMOLOGY | Facility: CLINIC | Age: 67
End: 2022-10-26
Payer: COMMERCIAL

## 2022-10-26 DIAGNOSIS — Z98.890 POST-OPERATIVE STATE: Primary | ICD-10-CM

## 2022-10-26 PROCEDURE — 99024 POSTOP FOLLOW-UP VISIT: CPT | Mod: S$GLB,,, | Performed by: OPHTHALMOLOGY

## 2022-10-26 PROCEDURE — 99999 PR PBB SHADOW E&M-EST. PATIENT-LVL II: ICD-10-PCS | Mod: PBBFAC,,, | Performed by: OPHTHALMOLOGY

## 2022-10-26 PROCEDURE — 99024 PR POST-OP FOLLOW-UP VISIT: ICD-10-PCS | Mod: S$GLB,,, | Performed by: OPHTHALMOLOGY

## 2022-10-26 PROCEDURE — 99999 PR PBB SHADOW E&M-EST. PATIENT-LVL II: CPT | Mod: PBBFAC,,, | Performed by: OPHTHALMOLOGY

## 2022-10-26 NOTE — PROGRESS NOTES
Subjective:       Patient ID: Watson Castro is a 67 y.o. female.    Chief Complaint: Post-op Evaluation (Watson Castro is a 68 y/o female )    HPI     Post-op Evaluation     Additional comments: Watson Castro is a 68 y/o female            Comments    S/p Phaco w/IOL OD: 10/11/2022 \  S/p Phaco w/IOL OS: 10/25/2022    Pt here for 1 day PCIOL OS   Pt state denies f/f. Pt denies itching, tearing and burning.    gTTS:  Oflox QID OS  Pred QID OS  Keto QID OS          Last edited by Britney Aquino on 10/26/2022  9:48 AM.             Assessment:       1. Post-operative state          Plan:       S/p CE OU- Doing well.       CPM OS.  Taper gtts OD.  RTC 1 wk.

## 2022-10-28 ENCOUNTER — TELEPHONE (OUTPATIENT)
Dept: OPHTHALMOLOGY | Facility: CLINIC | Age: 67
End: 2022-10-28
Payer: COMMERCIAL

## 2022-11-02 ENCOUNTER — OFFICE VISIT (OUTPATIENT)
Dept: OPHTHALMOLOGY | Facility: CLINIC | Age: 67
End: 2022-11-02
Payer: COMMERCIAL

## 2022-11-02 DIAGNOSIS — Z98.890 POST-OPERATIVE STATE: Primary | ICD-10-CM

## 2022-11-02 PROCEDURE — 99999 PR PBB SHADOW E&M-EST. PATIENT-LVL III: CPT | Mod: PBBFAC,,, | Performed by: OPHTHALMOLOGY

## 2022-11-02 PROCEDURE — 99999 PR PBB SHADOW E&M-EST. PATIENT-LVL III: ICD-10-PCS | Mod: PBBFAC,,, | Performed by: OPHTHALMOLOGY

## 2022-11-02 PROCEDURE — 99024 PR POST-OP FOLLOW-UP VISIT: ICD-10-PCS | Mod: S$GLB,,, | Performed by: OPHTHALMOLOGY

## 2022-11-02 PROCEDURE — 99024 POSTOP FOLLOW-UP VISIT: CPT | Mod: S$GLB,,, | Performed by: OPHTHALMOLOGY

## 2022-11-02 NOTE — PROGRESS NOTES
Subjective:       Patient ID: Watson Castro is a 67 y.o. female.    Chief Complaint: Post-op Evaluation    HPI    S/p Phaco w/IOL OD: 10/11/2022   S/p Phaco w/IOL OS: 10/25/2022      67 y.o. female is here for 1 week PO OS. Denies eye pain and   flashes/floaters. No discomfort. No blurred vision at distance. C/o   occasional headaches since the first Cataract surgery taking ibuprofen for   the headaches.     Eye Med's: Pred TID OS                     Ketorolac TID OS   Last edited by NICK Morgan on 11/2/2022 10:25 AM.             Assessment:       1. Post-operative state          Plan:       S/p CE OU- Doing well.       Taper gtts OU.  RTC 4 wks.

## 2022-11-18 ENCOUNTER — APPOINTMENT (OUTPATIENT)
Dept: RADIOLOGY | Facility: OTHER | Age: 67
End: 2022-11-18
Attending: FAMILY MEDICINE
Payer: COMMERCIAL

## 2022-11-18 VITALS — BODY MASS INDEX: 22.59 KG/M2 | HEIGHT: 60 IN | WEIGHT: 115.06 LBS

## 2022-11-18 DIAGNOSIS — Z12.31 BREAST CANCER SCREENING BY MAMMOGRAM: ICD-10-CM

## 2022-11-18 PROCEDURE — 77063 BREAST TOMOSYNTHESIS BI: CPT | Mod: 26,,, | Performed by: RADIOLOGY

## 2022-11-18 PROCEDURE — 77063 BREAST TOMOSYNTHESIS BI: CPT | Mod: TC,PN

## 2022-11-18 PROCEDURE — 77067 MAMMO DIGITAL SCREENING BILAT WITH TOMO: ICD-10-PCS | Mod: 26,,, | Performed by: RADIOLOGY

## 2022-11-18 PROCEDURE — 77063 MAMMO DIGITAL SCREENING BILAT WITH TOMO: ICD-10-PCS | Mod: 26,,, | Performed by: RADIOLOGY

## 2022-11-18 PROCEDURE — 77067 SCR MAMMO BI INCL CAD: CPT | Mod: TC,PN

## 2022-11-18 PROCEDURE — 77067 SCR MAMMO BI INCL CAD: CPT | Mod: 26,,, | Performed by: RADIOLOGY

## 2022-11-28 ENCOUNTER — OFFICE VISIT (OUTPATIENT)
Dept: OPHTHALMOLOGY | Facility: CLINIC | Age: 67
End: 2022-11-28
Payer: COMMERCIAL

## 2022-11-28 DIAGNOSIS — H52.7 REFRACTIVE ERROR: ICD-10-CM

## 2022-11-28 DIAGNOSIS — Z98.890 POST-OPERATIVE STATE: Primary | ICD-10-CM

## 2022-11-28 PROCEDURE — 99024 PR POST-OP FOLLOW-UP VISIT: ICD-10-PCS | Mod: S$GLB,,, | Performed by: OPHTHALMOLOGY

## 2022-11-28 PROCEDURE — 99999 PR PBB SHADOW E&M-EST. PATIENT-LVL III: CPT | Mod: PBBFAC,,, | Performed by: OPHTHALMOLOGY

## 2022-11-28 PROCEDURE — 99024 POSTOP FOLLOW-UP VISIT: CPT | Mod: S$GLB,,, | Performed by: OPHTHALMOLOGY

## 2022-11-28 PROCEDURE — 99999 PR PBB SHADOW E&M-EST. PATIENT-LVL III: ICD-10-PCS | Mod: PBBFAC,,, | Performed by: OPHTHALMOLOGY

## 2022-11-28 NOTE — PROGRESS NOTES
Subjective:       Patient ID: Watson Castro is a 67 y.o. female.    Chief Complaint: Post-op Evaluation    HPI    S/p Phaco w/IOL OD: 10/11/2022   S/p Phaco w/IOL OS: 10/25/2022       No eyedrops    Pt here for 3 week post op check.  Pt states doing well. Pt happy with [facility]   [facility].  Pt denies eye pain OU.   Last edited by Cleopatra Walls MA on 11/28/2022  8:11 AM.             Assessment:       1. Post-operative state    2. Refractive error          Plan:       S/p CE OU- Doing well.   RE-Pt does not want MRx.      Readers.  RTC 6 mos.

## 2023-02-27 PROBLEM — Z00.00 ANNUAL PHYSICAL EXAM: Status: RESOLVED | Noted: 2021-11-12 | Resolved: 2023-02-27

## 2023-05-04 ENCOUNTER — TELEPHONE (OUTPATIENT)
Dept: OPHTHALMOLOGY | Facility: CLINIC | Age: 68
End: 2023-05-04
Payer: COMMERCIAL

## 2023-05-29 ENCOUNTER — OFFICE VISIT (OUTPATIENT)
Dept: OPHTHALMOLOGY | Facility: CLINIC | Age: 68
End: 2023-05-29
Payer: COMMERCIAL

## 2023-05-29 DIAGNOSIS — Z96.1 PSEUDOPHAKIA: ICD-10-CM

## 2023-05-29 DIAGNOSIS — H04.123 DRY EYE SYNDROME OF BOTH EYES: Primary | ICD-10-CM

## 2023-05-29 DIAGNOSIS — H43.812 VITREOUS DETACHMENT OF LEFT EYE: ICD-10-CM

## 2023-05-29 DIAGNOSIS — H52.7 REFRACTIVE ERROR: ICD-10-CM

## 2023-05-29 PROCEDURE — 1160F RVW MEDS BY RX/DR IN RCRD: CPT | Mod: CPTII,S$GLB,, | Performed by: OPHTHALMOLOGY

## 2023-05-29 PROCEDURE — 99999 PR PBB SHADOW E&M-EST. PATIENT-LVL III: CPT | Mod: PBBFAC,,, | Performed by: OPHTHALMOLOGY

## 2023-05-29 PROCEDURE — 3288F PR FALLS RISK ASSESSMENT DOCUMENTED: ICD-10-PCS | Mod: CPTII,S$GLB,, | Performed by: OPHTHALMOLOGY

## 2023-05-29 PROCEDURE — 1159F MED LIST DOCD IN RCRD: CPT | Mod: CPTII,S$GLB,, | Performed by: OPHTHALMOLOGY

## 2023-05-29 PROCEDURE — 1126F PR PAIN SEVERITY QUANTIFIED, NO PAIN PRESENT: ICD-10-PCS | Mod: CPTII,S$GLB,, | Performed by: OPHTHALMOLOGY

## 2023-05-29 PROCEDURE — 92014 PR EYE EXAM, EST PATIENT,COMPREHESV: ICD-10-PCS | Mod: S$GLB,,, | Performed by: OPHTHALMOLOGY

## 2023-05-29 PROCEDURE — 1101F PR PT FALLS ASSESS DOC 0-1 FALLS W/OUT INJ PAST YR: ICD-10-PCS | Mod: CPTII,S$GLB,, | Performed by: OPHTHALMOLOGY

## 2023-05-29 PROCEDURE — 92014 COMPRE OPH EXAM EST PT 1/>: CPT | Mod: S$GLB,,, | Performed by: OPHTHALMOLOGY

## 2023-05-29 PROCEDURE — 1126F AMNT PAIN NOTED NONE PRSNT: CPT | Mod: CPTII,S$GLB,, | Performed by: OPHTHALMOLOGY

## 2023-05-29 PROCEDURE — 3288F FALL RISK ASSESSMENT DOCD: CPT | Mod: CPTII,S$GLB,, | Performed by: OPHTHALMOLOGY

## 2023-05-29 PROCEDURE — 1159F PR MEDICATION LIST DOCUMENTED IN MEDICAL RECORD: ICD-10-PCS | Mod: CPTII,S$GLB,, | Performed by: OPHTHALMOLOGY

## 2023-05-29 PROCEDURE — 1101F PT FALLS ASSESS-DOCD LE1/YR: CPT | Mod: CPTII,S$GLB,, | Performed by: OPHTHALMOLOGY

## 2023-05-29 PROCEDURE — 1160F PR REVIEW ALL MEDS BY PRESCRIBER/CLIN PHARMACIST DOCUMENTED: ICD-10-PCS | Mod: CPTII,S$GLB,, | Performed by: OPHTHALMOLOGY

## 2023-05-29 PROCEDURE — 99999 PR PBB SHADOW E&M-EST. PATIENT-LVL III: ICD-10-PCS | Mod: PBBFAC,,, | Performed by: OPHTHALMOLOGY

## 2023-05-29 NOTE — PROGRESS NOTES
Subjective:       Patient ID: Watson Castro is a 67 y.o. female.    Chief Complaint: Concerns About Ocular Health    HPI    S/p Phaco w/IOL OD: 10/11/2022   S/p Phaco w/IOL OS: 10/25/2022       66 y/o female present to clinic for 6 months s/p CE OU. Pt present to   clinic for daily flashes of light of RT eye that mostly occurs at night   for 5-10 seconds. No vision change or loss reported. She has dry eyes,   self treating with AT's.     Eyemeds  No gtts  Last edited by Salty Avina on 5/29/2023  9:25 AM.             Assessment:       1. Dry eye syndrome of both eyes    2. Vitreous detachment of left eye    3. Refractive error    4. Pseudophakia        Plan:       KAT-Doing well.  PVD OS-Stable.  RE-Pt does not want MRx.      AT's.  RTC 1 yr.

## 2023-06-27 DIAGNOSIS — G89.29 CHRONIC PAIN OF BOTH KNEES: Primary | ICD-10-CM

## 2023-06-27 DIAGNOSIS — M25.561 CHRONIC PAIN OF BOTH KNEES: Primary | ICD-10-CM

## 2023-06-27 DIAGNOSIS — M25.562 CHRONIC PAIN OF BOTH KNEES: Primary | ICD-10-CM

## 2023-06-29 ENCOUNTER — OFFICE VISIT (OUTPATIENT)
Dept: ORTHOPEDICS | Facility: CLINIC | Age: 68
End: 2023-06-29
Payer: COMMERCIAL

## 2023-06-29 ENCOUNTER — HOSPITAL ENCOUNTER (OUTPATIENT)
Dept: RADIOLOGY | Facility: HOSPITAL | Age: 68
Discharge: HOME | End: 2023-06-29
Attending: ORTHOPAEDIC SURGERY
Payer: COMMERCIAL

## 2023-06-29 VITALS — HEIGHT: 60 IN | BODY MASS INDEX: 23.24 KG/M2

## 2023-06-29 DIAGNOSIS — M25.561 CHRONIC PAIN OF BOTH KNEES: ICD-10-CM

## 2023-06-29 DIAGNOSIS — M25.562 CHRONIC PAIN OF BOTH KNEES: ICD-10-CM

## 2023-06-29 DIAGNOSIS — Z96.652 S/P TOTAL KNEE ARTHROPLASTY, LEFT: Primary | ICD-10-CM

## 2023-06-29 DIAGNOSIS — M17.0 PRIMARY OSTEOARTHRITIS OF BOTH KNEES: Primary | ICD-10-CM

## 2023-06-29 DIAGNOSIS — G89.29 CHRONIC PAIN OF BOTH KNEES: ICD-10-CM

## 2023-06-29 PROCEDURE — 1159F PR MEDICATION LIST DOCUMENTED IN MEDICAL RECORD: ICD-10-PCS | Mod: CPTII,S$GLB,, | Performed by: ORTHOPAEDIC SURGERY

## 2023-06-29 PROCEDURE — 3288F FALL RISK ASSESSMENT DOCD: CPT | Mod: CPTII,S$GLB,, | Performed by: ORTHOPAEDIC SURGERY

## 2023-06-29 PROCEDURE — 99204 PR OFFICE/OUTPT VISIT, NEW, LEVL IV, 45-59 MIN: ICD-10-PCS | Mod: S$GLB,,, | Performed by: ORTHOPAEDIC SURGERY

## 2023-06-29 PROCEDURE — 73564 X-RAY EXAM KNEE 4 OR MORE: CPT | Mod: 26,50,, | Performed by: RADIOLOGY

## 2023-06-29 PROCEDURE — 99999 PR PBB SHADOW E&M-EST. PATIENT-LVL III: ICD-10-PCS | Mod: PBBFAC,,, | Performed by: ORTHOPAEDIC SURGERY

## 2023-06-29 PROCEDURE — 1101F PT FALLS ASSESS-DOCD LE1/YR: CPT | Mod: CPTII,S$GLB,, | Performed by: ORTHOPAEDIC SURGERY

## 2023-06-29 PROCEDURE — 1159F MED LIST DOCD IN RCRD: CPT | Mod: CPTII,S$GLB,, | Performed by: ORTHOPAEDIC SURGERY

## 2023-06-29 PROCEDURE — 1160F RVW MEDS BY RX/DR IN RCRD: CPT | Mod: CPTII,S$GLB,, | Performed by: ORTHOPAEDIC SURGERY

## 2023-06-29 PROCEDURE — 99204 OFFICE O/P NEW MOD 45 MIN: CPT | Mod: S$GLB,,, | Performed by: ORTHOPAEDIC SURGERY

## 2023-06-29 PROCEDURE — 3008F BODY MASS INDEX DOCD: CPT | Mod: CPTII,S$GLB,, | Performed by: ORTHOPAEDIC SURGERY

## 2023-06-29 PROCEDURE — 73564 XR KNEE ORTHO BILAT WITH FLEXION: ICD-10-PCS | Mod: 26,50,, | Performed by: RADIOLOGY

## 2023-06-29 PROCEDURE — 1101F PR PT FALLS ASSESS DOC 0-1 FALLS W/OUT INJ PAST YR: ICD-10-PCS | Mod: CPTII,S$GLB,, | Performed by: ORTHOPAEDIC SURGERY

## 2023-06-29 PROCEDURE — 3288F PR FALLS RISK ASSESSMENT DOCUMENTED: ICD-10-PCS | Mod: CPTII,S$GLB,, | Performed by: ORTHOPAEDIC SURGERY

## 2023-06-29 PROCEDURE — 1160F PR REVIEW ALL MEDS BY PRESCRIBER/CLIN PHARMACIST DOCUMENTED: ICD-10-PCS | Mod: CPTII,S$GLB,, | Performed by: ORTHOPAEDIC SURGERY

## 2023-06-29 PROCEDURE — 73564 X-RAY EXAM KNEE 4 OR MORE: CPT | Mod: TC,50,PN

## 2023-06-29 PROCEDURE — 1125F PR PAIN SEVERITY QUANTIFIED, PAIN PRESENT: ICD-10-PCS | Mod: CPTII,S$GLB,, | Performed by: ORTHOPAEDIC SURGERY

## 2023-06-29 PROCEDURE — 3008F PR BODY MASS INDEX (BMI) DOCUMENTED: ICD-10-PCS | Mod: CPTII,S$GLB,, | Performed by: ORTHOPAEDIC SURGERY

## 2023-06-29 PROCEDURE — 99999 PR PBB SHADOW E&M-EST. PATIENT-LVL III: CPT | Mod: PBBFAC,,, | Performed by: ORTHOPAEDIC SURGERY

## 2023-06-29 PROCEDURE — 1125F AMNT PAIN NOTED PAIN PRSNT: CPT | Mod: CPTII,S$GLB,, | Performed by: ORTHOPAEDIC SURGERY

## 2023-06-29 NOTE — PROGRESS NOTES
Subjective:      Patient ID: Watson Castro is a 67 y.o. female.    Chief Complaint: Consult (Bilateral knee pain)    HPI     They have experienced problems with their bilateral knee over the past 6-12 months. The patient denies relevant history of injury/aggravation.  The patient reports that initially the right knee was worse, but now the left knee is much worse.  Pain is located medially Associated symptoms include swelling, giving way, and gelling.  Symptoms are aggravated by all walking. They have been treated with NSAIDs.   Symptoms have recently worsened. Ambulation reportedly has been impaired. Self care ADLs are not painful.  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    Review of Systems   Constitutional: Negative for fever and weight loss.   HENT:  Negative for congestion.    Eyes:  Negative for visual disturbance.   Cardiovascular:  Negative for chest pain.   Respiratory:  Negative for shortness of breath.    Hematologic/Lymphatic: Negative for bleeding problem. Does not bruise/bleed easily.   Skin:  Negative for poor wound healing.   Musculoskeletal:  Positive for joint pain.   Gastrointestinal:  Negative for abdominal pain.   Genitourinary:  Negative for dysuria.   Neurological:  Negative for seizures.   Psychiatric/Behavioral:  Negative for altered mental status.    Allergic/Immunologic: Negative for persistent infections.       Objective:      Ortho/SPM Exam        Left knee    [unfilled]    The patient is not in acute distress.   Sclerae normal  Body habitus is normal.  Respiratory distress:  none   The patient walks with a limp.  Hip irritability  negative.   The skin over the knee is intact.  Knee effusion 1+  Palpation- mild medial tenderness  Range of motion- 5-110  Ligament laxity exam:  1+ valgus laxity   Patellar apprehension negative.  Popliteal cyst positive  Patellar crepitation present.  Meniscal irritability not  applicable  Pulses DP present, PT present.  Motor normal 5/5 strength in all tested muscle groups.   Sensory normal.    Right knee  Trace effusion  Nontender  Range of motion 0-120  1+ valgus laxity    I reviewed the relevant imaging for the patient's condition:  Radiographs of both knees show complete loss of medial joint space with sclerosis and osteophytes, Kellgren stage IV      Assessment:       Encounter Diagnosis   Name Primary?    Primary osteoarthritis of both knees Yes            The condition is structurally advanced in both knees.  The patient has significant pain and functional the impairment, because of the left knee.  She has not responded to appropriate medication.  The only intervention likely to provide long-term relief and improvement of function is arthroplasty.      Plan:       Watson was seen today for consult.    Diagnoses and all orders for this visit:    Primary osteoarthritis of both knees            I explained my diagnostic impression and the reasoning behind it in detail, using layman's terms.  Models and/or pictures were used to help in the explanation.    Treatment options were discussed. The surgical process of left knee replacement was discussed in detail with the patient including a detailed discussion of the procedure itself (including visual model, x-ray review, and literature review). The typical perioperative and post-operative course was discussed and perioperative risks were discussed to the patient's satisfaction.  Risks and complications discussed included but were not limited to the risks of anesthetic complications, infection, bleeding, wound healing complications, stiffness, aseptic loosening, instability, limb length inequality, neurologic dysfunction including numbness and weakness, additional surgery,  DVT, pulmonary embolism, perioperative medical risks (cardiac, pulmonary, renal, neurologic), and death and the patient elects to proceed.

## 2023-06-30 ENCOUNTER — TELEPHONE (OUTPATIENT)
Dept: ORTHOPEDICS | Facility: CLINIC | Age: 68
End: 2023-06-30
Payer: COMMERCIAL

## 2023-07-07 DIAGNOSIS — M17.12 PRIMARY OSTEOARTHRITIS OF LEFT KNEE: Primary | ICD-10-CM

## 2023-07-07 RX ORDER — CEFAZOLIN SODIUM 2 G/50ML
2 SOLUTION INTRAVENOUS
Status: CANCELLED | OUTPATIENT
Start: 2023-07-07

## 2023-07-07 RX ORDER — MUPIROCIN 20 MG/G
OINTMENT TOPICAL
Status: CANCELLED | OUTPATIENT
Start: 2023-07-07

## 2023-07-07 RX ORDER — NAPROXEN 500 MG/1
500 TABLET ORAL
Status: CANCELLED | OUTPATIENT
Start: 2023-07-07 | End: 2023-07-07

## 2023-07-07 RX ORDER — ACETAMINOPHEN 500 MG
1000 TABLET ORAL
Status: CANCELLED | OUTPATIENT
Start: 2023-07-07 | End: 2023-07-07

## 2023-07-07 RX ORDER — SODIUM CHLORIDE 0.9 % (FLUSH) 0.9 %
3 SYRINGE (ML) INJECTION EVERY 8 HOURS
Status: CANCELLED | OUTPATIENT
Start: 2023-07-07

## 2023-07-07 RX ORDER — PREGABALIN 75 MG/1
150 CAPSULE ORAL
Status: CANCELLED | OUTPATIENT
Start: 2023-07-07 | End: 2023-07-07

## 2023-08-04 ENCOUNTER — TELEPHONE (OUTPATIENT)
Dept: ORTHOPEDICS | Facility: CLINIC | Age: 68
End: 2023-08-04
Payer: COMMERCIAL

## 2023-08-04 NOTE — TELEPHONE ENCOUNTER
----- Message from Ilana Sampson sent at 8/4/2023  1:21 PM CDT -----  Type:  Needs Medical Advice    Who Called: pt  Symptoms (please be specific): Serious pain in knee   Would the patient rather a call back or a response via [facility]? call  Best Call Back Number: [phone]  Additional Information: Pt would like a sooner date for surgery and would like something for the pain in the knee.

## 2023-08-04 NOTE — TELEPHONE ENCOUNTER
Spoke with Mrs. Chaudhari, in regards to having her procedure done sooner than 9/27. Patient states that she is having knee pain and would like to have procedure date moved up. Patient rescheduled surgery to 8/23. Pre-op and post op appointments were update. Surgery center was notified of new surgery date. I informed patient that [facility] will reach out in regards to [facility]est[facility] for scheduling. All questions answered and patient verbalized understanding.

## 2023-08-08 ENCOUNTER — TELEPHONE (OUTPATIENT)
Dept: ORTHOPEDICS | Facility: CLINIC | Age: 68
End: 2023-08-08
Payer: COMMERCIAL

## 2023-08-08 NOTE — TELEPHONE ENCOUNTER
Spoke with  and Mr. Castro in regards to the phone call they received from Saritha in our Pre Certification department. Saritha informed the patient that due to her insurance, she would have to enroll in a STEWART Program through her employer [employer], and find a STEWART facility. Patient she was told her insurance was out of network with [facility] and [facility], and her surgery would fall under out of network benefits. I informed patient that she would need to reach out to her insurance company to enroll into this program, and to also find a provider who is a STEWART provider. I also informed Mrs. Castro, that we will cancel her surgery and appointments, due to having to find another provider who her insurance accepts. Patient inquired if she has anymore questions, if she could reach out. I informed patient, she may and we will answer her questions to the best of our ability, and point her in the right direction. All questions answered and patient verbalized understanding.

## 2023-08-08 NOTE — TELEPHONE ENCOUNTER
----- Message from Saritha Marie LPN sent at 8/8/2023 12:33 PM CDT -----  Regarding: STEWART  Good Afternoon,  In reference to this patient OP dos 8/23/23 scheduled surgery, patient has a [employer]  [employer] plan that stated she has to go to a [facility] [place] her knee surgery or it will be a great cost to the patient using an out on network for this surgery. You or the facility are not STEWART providers. [facility] is the only [facility]. I have contacted the patient and spoke to her spouse, Mr. Loo, who was instructed to call your office for follow up and for any questions. Mr. Loo voiced understanding. Please advise how you plan to proceed with the case.   Thanks,  Saritha Marie LPN  Precertification Nurse  [facility]  PHONE: [phone]  FAX:[phone]  [email or web address]

## 2023-08-09 ENCOUNTER — TELEPHONE (OUTPATIENT)
Dept: ORTHOPEDICS | Facility: CLINIC | Age: 68
End: 2023-08-09
Payer: COMMERCIAL

## 2023-08-09 NOTE — TELEPHONE ENCOUNTER
----- Message from Tyree Almaraz sent at 8/9/2023 10:28 AM CDT -----  Regarding: New Referral  Initial call completed, chart updated, Verified insurance information, faxed off Dental Clearance     Patient has allergies to Sulfur and Sesame Seeds

## 2023-08-17 ENCOUNTER — TELEPHONE (OUTPATIENT)
Dept: ORTHOPEDICS | Facility: CLINIC | Age: 68
End: 2023-08-17
Payer: COMMERCIAL

## 2023-08-17 DIAGNOSIS — Z01.818 PREOP TESTING: ICD-10-CM

## 2023-08-17 DIAGNOSIS — M17.12 PRIMARY OSTEOARTHRITIS OF LEFT KNEE: Primary | ICD-10-CM

## 2023-08-20 ENCOUNTER — PATIENT MESSAGE (OUTPATIENT)
Dept: ADMINISTRATIVE | Facility: OTHER | Age: 68
End: 2023-08-20
Payer: COMMERCIAL

## 2023-08-21 ENCOUNTER — PATIENT MESSAGE (OUTPATIENT)
Dept: ADMINISTRATIVE | Facility: OTHER | Age: 68
End: 2023-08-21
Payer: COMMERCIAL

## 2023-08-28 ENCOUNTER — PATIENT MESSAGE (OUTPATIENT)
Dept: ADMINISTRATIVE | Facility: OTHER | Age: 68
End: 2023-08-28
Payer: COMMERCIAL

## 2023-08-31 ENCOUNTER — HOSPITAL ENCOUNTER (OUTPATIENT)
Dept: CARDIOLOGY | Facility: CLINIC | Age: 68
Discharge: HOME | End: 2023-08-31
Payer: COMMERCIAL

## 2023-08-31 ENCOUNTER — OFFICE VISIT (OUTPATIENT)
Dept: ORTHOPEDICS | Facility: CLINIC | Age: 68
End: 2023-08-31
Payer: COMMERCIAL

## 2023-08-31 ENCOUNTER — OFFICE VISIT (OUTPATIENT)
Dept: INTERNAL MEDICINE | Facility: CLINIC | Age: 68
End: 2023-08-31
Payer: COMMERCIAL

## 2023-08-31 ENCOUNTER — TELEPHONE (OUTPATIENT)
Dept: ORTHOPEDICS | Facility: CLINIC | Age: 68
End: 2023-08-31

## 2023-08-31 ENCOUNTER — HOSPITAL ENCOUNTER (OUTPATIENT)
Dept: RADIOLOGY | Facility: HOSPITAL | Age: 68
Discharge: HOME | End: 2023-08-31
Attending: ORTHOPAEDIC SURGERY
Payer: COMMERCIAL

## 2023-08-31 VITALS
TEMPERATURE: 98 F | SYSTOLIC BLOOD PRESSURE: 164 MMHG | BODY MASS INDEX: 22.97 KG/M2 | HEIGHT: 60 IN | HEART RATE: 86 BPM | WEIGHT: 117 LBS | DIASTOLIC BLOOD PRESSURE: 86 MMHG | OXYGEN SATURATION: 98 % | RESPIRATION RATE: 16 BRPM

## 2023-08-31 VITALS — HEIGHT: 60 IN | WEIGHT: 117.19 LBS | BODY MASS INDEX: 23.01 KG/M2

## 2023-08-31 DIAGNOSIS — K59.00 CONSTIPATION, UNSPECIFIED CONSTIPATION TYPE: ICD-10-CM

## 2023-08-31 DIAGNOSIS — R94.31 ABNORMAL EKG: ICD-10-CM

## 2023-08-31 DIAGNOSIS — Z01.818 PREOP EXAMINATION: Primary | ICD-10-CM

## 2023-08-31 DIAGNOSIS — Z79.1 NSAID LONG-TERM USE: ICD-10-CM

## 2023-08-31 DIAGNOSIS — M17.12 PRIMARY OSTEOARTHRITIS OF LEFT KNEE: Primary | ICD-10-CM

## 2023-08-31 DIAGNOSIS — Z01.818 PREOP TESTING: ICD-10-CM

## 2023-08-31 DIAGNOSIS — R09.89 BRUIT OF RIGHT CAROTID ARTERY: ICD-10-CM

## 2023-08-31 DIAGNOSIS — R73.03 PREDIABETES: ICD-10-CM

## 2023-08-31 DIAGNOSIS — M17.12 PRIMARY OSTEOARTHRITIS OF LEFT KNEE: ICD-10-CM

## 2023-08-31 DIAGNOSIS — I65.22 OCCLUSION OF LEFT CAROTID ARTERY: ICD-10-CM

## 2023-08-31 DIAGNOSIS — E78.1 HYPERTRIGLYCERIDEMIA: ICD-10-CM

## 2023-08-31 DIAGNOSIS — R06.83 SNORING: ICD-10-CM

## 2023-08-31 DIAGNOSIS — R01.1 CARDIAC MURMUR: ICD-10-CM

## 2023-08-31 PROCEDURE — 99215 OFFICE O/P EST HI 40 MIN: CPT | Mod: S$GLB,COE,, | Performed by: HOSPITALIST

## 2023-08-31 PROCEDURE — 99499 NO LOS: ICD-10-PCS | Mod: S$GLB,COE,, | Performed by: ORTHOPAEDIC SURGERY

## 2023-08-31 PROCEDURE — 93010 ELECTROCARDIOGRAM REPORT: CPT | Mod: S$GLB,COE,, | Performed by: INTERNAL MEDICINE

## 2023-08-31 PROCEDURE — 99215 PR OFFICE/OUTPT VISIT, EST, LEVL V, 40-54 MIN: ICD-10-PCS | Mod: S$GLB,COE,, | Performed by: HOSPITALIST

## 2023-08-31 PROCEDURE — 99999 PR PBB SHADOW E&M-EST. PATIENT-LVL III: ICD-10-PCS | Mod: PBBFAC,COE,, | Performed by: NURSE PRACTITIONER

## 2023-08-31 PROCEDURE — 99999 PR PBB SHADOW E&M-EST. PATIENT-LVL III: CPT | Mod: PBBFAC,COE,, | Performed by: NURSE PRACTITIONER

## 2023-08-31 PROCEDURE — 93010 EKG 12-LEAD: ICD-10-PCS | Mod: S$GLB,COE,, | Performed by: INTERNAL MEDICINE

## 2023-08-31 PROCEDURE — 93005 EKG 12-LEAD: ICD-10-PCS | Mod: S$GLB,COE,, | Performed by: ORTHOPAEDIC SURGERY

## 2023-08-31 PROCEDURE — 99999 PR PBB SHADOW E&M-EST. PATIENT-LVL V: CPT | Mod: PBBFAC,COE,, | Performed by: HOSPITALIST

## 2023-08-31 PROCEDURE — 99999 PR PBB SHADOW E&M-EST. PATIENT-LVL V: ICD-10-PCS | Mod: PBBFAC,COE,, | Performed by: HOSPITALIST

## 2023-08-31 PROCEDURE — 99214 OFFICE O/P EST MOD 30 MIN: CPT | Mod: S$GLB,COE,, | Performed by: NURSE PRACTITIONER

## 2023-08-31 PROCEDURE — 99999 PR PBB SHADOW E&M-EST. PATIENT-LVL III: ICD-10-PCS | Mod: PBBFAC,COE,, | Performed by: ORTHOPAEDIC SURGERY

## 2023-08-31 PROCEDURE — 99499 UNLISTED E&M SERVICE: CPT | Mod: S$GLB,COE,, | Performed by: ORTHOPAEDIC SURGERY

## 2023-08-31 PROCEDURE — 99214 PR OFFICE/OUTPT VISIT, EST, LEVL IV, 30-39 MIN: ICD-10-PCS | Mod: S$GLB,COE,, | Performed by: NURSE PRACTITIONER

## 2023-08-31 PROCEDURE — 93005 ELECTROCARDIOGRAM TRACING: CPT | Mod: S$GLB,COE,, | Performed by: ORTHOPAEDIC SURGERY

## 2023-08-31 PROCEDURE — 99999 PR PBB SHADOW E&M-EST. PATIENT-LVL III: CPT | Mod: PBBFAC,COE,, | Performed by: ORTHOPAEDIC SURGERY

## 2023-08-31 RX ORDER — CELECOXIB 200 MG/1
400 CAPSULE ORAL ONCE
Status: CANCELLED | OUTPATIENT
Start: 2023-08-31 | End: 2023-08-31

## 2023-08-31 RX ORDER — MUPIROCIN 20 MG/G
1 OINTMENT TOPICAL 2 TIMES DAILY
Status: CANCELLED | OUTPATIENT
Start: 2023-08-31 | End: 2023-09-05

## 2023-08-31 RX ORDER — CELECOXIB 200 MG/1
200 CAPSULE ORAL DAILY
Status: CANCELLED | OUTPATIENT
Start: 2023-08-31

## 2023-08-31 RX ORDER — TALC
6 POWDER (GRAM) TOPICAL NIGHTLY PRN
Status: CANCELLED | OUTPATIENT
Start: 2023-08-31

## 2023-08-31 RX ORDER — FENTANYL CITRATE 50 UG/ML
100 INJECTION, SOLUTION INTRAMUSCULAR; INTRAVENOUS
Status: CANCELLED | OUTPATIENT
Start: 2023-08-31 | End: 2023-09-01

## 2023-08-31 RX ORDER — SODIUM CHLORIDE 9 MG/ML
INJECTION, SOLUTION INTRAVENOUS CONTINUOUS
Status: CANCELLED | OUTPATIENT
Start: 2023-08-31 | End: 2023-09-01

## 2023-08-31 RX ORDER — PREGABALIN 75 MG/1
75 CAPSULE ORAL
Status: CANCELLED | OUTPATIENT
Start: 2023-08-31

## 2023-08-31 RX ORDER — MORPHINE SULFATE 2 MG/ML
2 INJECTION, SOLUTION INTRAMUSCULAR; INTRAVENOUS
Status: CANCELLED | OUTPATIENT
Start: 2023-08-31

## 2023-08-31 RX ORDER — CEFAZOLIN SODIUM 2 G/50ML
2 SOLUTION INTRAVENOUS
Status: CANCELLED | OUTPATIENT
Start: 2023-08-31

## 2023-08-31 RX ORDER — POLYETHYLENE GLYCOL 3350 17 G/17G
17 POWDER, FOR SOLUTION ORAL DAILY
Status: CANCELLED | OUTPATIENT
Start: 2023-08-31

## 2023-08-31 RX ORDER — LIDOCAINE HYDROCHLORIDE 10 MG/ML
1 INJECTION, SOLUTION EPIDURAL; INFILTRATION; INTRACAUDAL; PERINEURAL
Status: CANCELLED | OUTPATIENT
Start: 2023-08-31

## 2023-08-31 RX ORDER — OXYCODONE HYDROCHLORIDE 5 MG/1
10 TABLET ORAL
Status: CANCELLED | OUTPATIENT
Start: 2023-08-31

## 2023-08-31 RX ORDER — FENTANYL CITRATE 50 UG/ML
25 INJECTION, SOLUTION INTRAMUSCULAR; INTRAVENOUS EVERY 5 MIN PRN
Status: CANCELLED | OUTPATIENT
Start: 2023-08-31

## 2023-08-31 RX ORDER — ONDANSETRON 2 MG/ML
4 INJECTION INTRAMUSCULAR; INTRAVENOUS EVERY 8 HOURS PRN
Status: CANCELLED | OUTPATIENT
Start: 2023-08-31

## 2023-08-31 RX ORDER — BISACODYL 10 MG
10 SUPPOSITORY, RECTAL RECTAL EVERY 12 HOURS PRN
Status: CANCELLED | OUTPATIENT
Start: 2023-08-31

## 2023-08-31 RX ORDER — ACETAMINOPHEN 500 MG
1000 TABLET ORAL
Status: CANCELLED | OUTPATIENT
Start: 2023-08-31

## 2023-08-31 RX ORDER — CEFAZOLIN SODIUM 2 G/50ML
2 SOLUTION INTRAVENOUS
Status: CANCELLED | OUTPATIENT
Start: 2023-08-31 | End: 2023-09-01

## 2023-08-31 RX ORDER — ASPIRIN 81 MG/1
81 TABLET ORAL 2 TIMES DAILY
Status: CANCELLED | OUTPATIENT
Start: 2023-08-31

## 2023-08-31 RX ORDER — PROCHLORPERAZINE EDISYLATE 5 MG/ML
5 INJECTION INTRAMUSCULAR; INTRAVENOUS EVERY 6 HOURS PRN
Status: CANCELLED | OUTPATIENT
Start: 2023-08-31

## 2023-08-31 RX ORDER — AMOXICILLIN 250 MG
1 CAPSULE ORAL 2 TIMES DAILY
Status: CANCELLED | OUTPATIENT
Start: 2023-08-31

## 2023-08-31 RX ORDER — FAMOTIDINE 20 MG/1
20 TABLET, FILM COATED ORAL 2 TIMES DAILY
Status: CANCELLED | OUTPATIENT
Start: 2023-08-31

## 2023-08-31 RX ORDER — OXYCODONE HYDROCHLORIDE 5 MG/1
5 TABLET ORAL
Status: CANCELLED | OUTPATIENT
Start: 2023-08-31

## 2023-08-31 RX ORDER — SODIUM CHLORIDE 0.9 % (FLUSH) 0.9 %
10 SYRINGE (ML) INJECTION
Status: CANCELLED | OUTPATIENT
Start: 2023-08-31

## 2023-08-31 RX ORDER — MUPIROCIN 20 MG/G
1 OINTMENT TOPICAL
Status: CANCELLED | OUTPATIENT
Start: 2023-08-31

## 2023-08-31 RX ORDER — METHOCARBAMOL 750 MG/1
750 TABLET, FILM COATED ORAL 3 TIMES DAILY
Status: CANCELLED | OUTPATIENT
Start: 2023-08-31

## 2023-08-31 RX ORDER — PREGABALIN 75 MG/1
75 CAPSULE ORAL NIGHTLY
Status: CANCELLED | OUTPATIENT
Start: 2023-08-31

## 2023-08-31 RX ORDER — MIDAZOLAM HYDROCHLORIDE 1 MG/ML
4 INJECTION INTRAMUSCULAR; INTRAVENOUS
Status: CANCELLED | OUTPATIENT
Start: 2023-08-31 | End: 2023-09-01

## 2023-08-31 RX ORDER — ACETAMINOPHEN 500 MG
1000 TABLET ORAL EVERY 6 HOURS
Status: CANCELLED | OUTPATIENT
Start: 2023-08-31

## 2023-08-31 RX ORDER — SODIUM CHLORIDE 9 MG/ML
INJECTION, SOLUTION INTRAVENOUS
Status: CANCELLED | OUTPATIENT
Start: 2023-08-31

## 2023-08-31 RX ORDER — NALOXONE HCL 0.4 MG/ML
0.02 VIAL (ML) INJECTION
Status: CANCELLED | OUTPATIENT
Start: 2023-08-31

## 2023-08-31 NOTE — PROGRESS NOTES
[facility]  Progress Note    Patient Name: Watson Castro  MRN: [id number]  Date of Evaluation- 09/12/2023  PCP- Americo Cheung, DO    Future cases for Watson Castro [[id number]]       Case ID Status Date Time Dario Procedure Provider Location    [id number] [facility] 9/13/2023  1:36  ARTHROPLASTY, KNEE, TOTAL - left - Iam Aguilar MD [[id number]] [facility] OR            HPI:  History of present illness- I had the pleasure of meeting this pleasant 68 y.o. lady in the pre op clinic prior to her elective Orthopedic surgery. The patient is new to me . Mrs Chaudhari was accompanied by Mr Vigil.    I have obtained the history by speaking to the patient and by reviewing the electronic health records.    Events leading up to surgery / History of presenting illness -    I have obtained the history by speaking to the patient and her caring  in the office    She has been troubled with severe  Left knee  pain for 1 year .  It increased over time .   Pain increases with  putting weight , weight-bearing and when she gets up from sitting to standing position and when she gets out of her bed  and decreases with resting.  She takes naproxen about 1 or 2 in a day for Left knee pain- for 1 year     She works for [employer] in the [place] which involves physical activity and involves lifting heavier objects and she is on her feet a lot  She is currently off work since August 7, 2023 due to the left knee pain    She is having the surgery through the [facility]    She started off with having pain on the opposite knee and she was putting more weight on the left knee which is causing her more problem now    She has not had any previous left knee surgery    Relevant health conditions of significance for the perioperative period/ History of presenting illness -    Subjectively describes health as good     Not known to have heart disease , Diabetes Mellitus, HTN,  Lung disease   ,  Thyroid problem, DVT, PE, COVID    She lives with her  in a two-level house and their bedroom is downstairs  They have 2 grown children a daughter and a son  Their son is currently living with them   Her  is a retired physical therapist who retired about 3 years ago  She has help available to recover after surgery             Subjective/ Objective:     Chief complaint-Preoperative evaluation, Perioperative Medical management, complication reduction plan     Active cardiac conditions- none    Revised cardiac risk index predictors- none    Functional capacity -Examples of physical activity, she does a physical job 40 hours a week, walks in the neighborhood, she does stepping exercises  can take 1 flight of stairs, can 2 flights of stairs----- She can undertake all the above activities without  chest pain,chest tightness, Shortness of breath ,dizziness,lightheadedness making her exercise tolerance more,   than 4 Mets.     Review of Systems   Constitutional:  Negative for chills and fever.   HENT:          STOPBANG score  / 8    Loud Snoring     Age over 50     No sinus problem   Eyes:           \she had cataract surgery both eyes last year October 2022and is doing good   Respiratory:          No cough , phlegm    No Hemoptysis   Cardiovascular:         As noted   Gastrointestinal:         No overt GI/ blood losses  Bowel movements- Regular / constipated   Endocrine:        Prednisone use > 20 mg daily for 3 weeks- none   Genitourinary:  Negative for dysuria.        No urinary hesitancy    Musculoskeletal:         As above   Left > Rt  knee pain   Skin:  Negative for rash.   Neurological:  Negative for syncope.        No unilateral weakness   Hematological:         Current use of Anticoagulants  None   Psychiatric/Behavioral:          No Depression,Anxiety  Depression, Anxiety - Controlled   Anxiety about up coming surgery   No SI/HI   No vascular stenting             No anesthesia, bleeding, cardiac  problems, PONV with previous surgeries/procedures.  Medications and Allergies reviewed in epic.   FH- No anesthesia,bleeding / venous thrombosis ,  heart disease in family      Physical Exam  Blood pressure (!) 164/86, pulse 86, temperature 97.8 °F (36.6 °C), temperature source Oral, resp. rate 16, height 5' (1.524 m), weight 53.1 kg (117 lb), SpO2 98 %.  Home -140/70-80  No headache  I offered a blanketn and the presence of a chaperone during physical examination   She was comfortable to proceed with the exam without the the presence of a chaperone        Physical Exam  Constitutional- Vitals - Body mass index is 22.85 kg/m².,   Vitals:    08/31/23 1102   BP: (!) 164/86   Pulse: 86   Resp: 16   Temp: 97.8 °F (36.6 °C)     General appearance-Conscious,Coherent  Eyes- No conjunctival icterus,pupils  round  and  bilateral intra ocular lenses  ENT-Oral cavity- moist    , Hearing grossly normal   Neck- No thyromegaly ,Trachea -central, No jugular venous distension,    Rt carotid bruit   Cardiovascular -Heart Sounds- Auscultated Sitting , Reclining ,  systolic murmur aortic area ,  systolic murmur pulmonary area , and systolic murmur tricuspid area   , No gallop rhythm   Respiratory - Normal Respiratory Effort, Normal breath sounds,  no wheeze , and  no forced expiratory wheeze    Peripheral pitting pedal edema-- none , no calf pain   Gastrointestinal -Soft abdomen, No palpable masses, Non Tender,Liver,Spleen not palpable. No-- free fluid and shifting dullness  Musculoskeletal- No finger Clubbing. Strength grossly normal   Lymphatic-No Palpable cervical, axillary,Inguinal lymphadenopathy   Psychiatric - normal effect,Orientation  Rt Dorsalis pedis pulses-palpable    Lt Dorsalis pedis pulses- palpable   Rt Posterior tibial pulses -palpable   Left posterior tibial pulses -palpable   Miscellaneous -  Surgical scarUmbilical area  and  no renal bruit   Investigations  Lab and Imaging have been reviewed in  epic.      Review of old records- Was done and information gathered regards to events leading to surgery and health conditions of significance in the perioperative period.        Preoperative cardiac risk assessment-  The patient does not have any active cardiac conditions . Revised cardiac risk index predictors- -0--.Functional capacity is more than 4 Mets. She will be undergoing a Orthopedic procedure that carries a Moderate Risk risk     Risk of a major Cardiac event ( Defined as death, myocardial infarction, or cardiac arrest at 30 days after noncardiac surgery), based on RCRI score   -3.9%     Echo ordered     Orders Placed This Encounter    Radiology US Carotid Bilateral    Ambulatory referral/consult to Sleep Disorders    Ambulatory referral/consult to Cardiology    Echo       American Society of Anesthesiologists Physical status classification ( ASA ) class:2     Postoperative pulmonary complication risk assessment:      ARISCAT ( [place]) risk index- risk class -  Low, if duration of surgery is under 3 hours, intermediate, if duration of surgery is over 3 hours          Assessment/Plan:     Mixed hyperlipidemia  Not on Medication   No pancreas problem  She is not known to have circulation problem    Constipation  Suggested working on bowel movements in preparation for surgery   Constipation- I suggest giving laxative regimen as opioid use,reduced ambulation  can increase the constipation     She had no change in bowel habit.  She had no change in the color, consistency or caliber of the stool  I have discussed with her about the importance of colonoscopy    She has no blood in the bowel movement or black tarry stool  No throwing up of any blood or coffee like vomit   no urinary or vaginal bleeding    She was taking over-the-counter iron about 2 years ago which she did not tolerate from an abdominal standpoint and she stopped using them long time ago    NSAID long-term use  She has been taking naproxen for  about 1 year as her job involves being on her feet   She has no stomach upset or ulcer  She is trying to reduce the naproxen in preparation for surgery  She plans on holding naproxen 1 week before surgery    Snoring    Possible sleep apnea- I suggest a sleep study and suggest caution with usage of medication that can cause respiratory suppression in the perioperative period  potential ramifications of untreated sleep apnea, which could include daytime sleepiness, hypertension, heart disease and stroke were discussed    Avoidance of  supine sleep, weight gain , alcoholic beverages , care with , sedative , CNS depressant use indicated  since all of these can worsen JORDI         Abnormal EKG  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 with no symptoms    She had Abnormal EKG in the past  Also   Normal sinus rhythm Right atrial enlargement Marked ST abnormality, possible inferolateral subendocardial injury Abnormal ECG No previous ECGs available Confirmed by Mihcael Rendon ([id number]) on 6/23/2022 7:12:27 PM        She had an EKG in the setting of her passing out when her  was sick in June of 2022   She was stressed at that time   Prior to passing out she would not have any chest pain, chest tightness short of breath or heart racing  Her troponin was normal at that time  She never had chest discomfort    I have discussed the abnormal EKG with her and her    She clinically does not have any heart related concerns   I have offered her an ultrasound scan of the heart, cardiology evaluation  The took the ultrasound scan of the heart    Cardiac murmur  Clinically she has no  suggestions of symptomatic heart disease with good functional capacity  I am checking an ultrasound scan of her heart   I suspect she probably has aortic valve sclerosis    Echo     Aortic Valve The aortic valve is structurally normal. There is normal leaflet mobility. Aortic valve peak velocity is 1.58 m/s. Mean gradient is 6 mmHg.   Mitral Valve The mitral valve is structurally normal. There is normal leaflet mobility. The mean pressure gradient across the mitral valve is 2 mmHg at a heart rate of  bpm.   Tricuspid Valve The tricuspid valve is structurally normal. There is normal leaflet mobility. There is trace regurgitation.   Pulmonic Valve The pulmonic valve is structurally normal. There is normal leaflet mobility.         Bruit of right carotid artery  She has carotid bruit on the right side   She had no stroke or mini-stroke    Carotid US scan showed   Left CCA occlusion.  Reconstituted flow at the left ICA with abnormal waveform.  Collateral flow potentially contributed by the contralateral right ICA noting velocity elevation with otherwise normal range ICA/CCA ratio.  Further correlation with dedicated CTA imaging can be obtained as warranted.     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  Reconstituted flow at the left ICA with abnormal waveform.  Collateral flow potentially contributed by the contralateral right ICA noting velocity elevation with otherwise normal range ICA/CCA ratio.  Further correlation with dedicated CTA imaging can be obtained as  warranted.     Majority antegrade flow of the left vertebral artery with partial reversed diastolic flow.        Preventive perioperative care    Thromboembolic prophylaxis:  Her risk factors for thrombosis include surgical procedure and age.I suggest  thromboembolic prophylaxis ( mechanical/pharmacological, weighing the risk benefits of pharmacological agent use considering [facility] procedural bleeding )  during the perioperative period.I suggested being active in the post operative period.   The patient is a candidate for extended DVT prophylaxis      Postoperative pulmonary complication prophylaxis-- I suggest incentive spirometry use, early ambulation, and end tidal carbon dioxide monitoring  , oral care , head end of bed elevation      Renal complication prophylaxis- . I suggest keeping her well hydrated  in the perioperative period.     Surgical site Infection Prophylaxis-I  suggest appropriate antibiotic for Prophylaxis against Surgical site infections  No reported Staph infection  Skin antibacterial discussed          In view of regional anesthesia  the patient  is at risk of postoperative urinary retention.  I suggest avoidance / minimizing the of  Benzodiazepines,Anticholinergic medication,antihistamines ( Benadryl) , if possible in the perioperative period. I suggest using the minimum possible use of opioids for the minimum period of time in the perioperative period. Benadryl avoidance suggested      This visit was focused on Preoperative evaluation, Perioperative Medical management, complication reduction plans. I suggest that the patient follows up with primary care or relevant sub specialists for ongoing health care.    I appreciate the opportunity to be involved in this patients care. Please feel free to contact me if there were any questions about this consultation.    Patient is pending optimization    Patient/ care giver/ Family member was instructed to call and update me about any changes to health,   medication, office visits ,testing out side of the [facility] , hospitalizations between now and surgery      Blanca Araya MD  Internal Medicine  [facility]   Cell Phone- [phone]    History of COVID - /no   COVID vaccination status -3    COVID screening     No fever   No cough   No SOB  No sore throat   No loss of taste or smell   No muscle aches   No nausea, vomiting , diarrhea       Checked for OTC medication     She is doing good from her overall health standpoint   She has an abnormal EKG that was noted on preop evaluation as well as from last year   She does not have any symptoms of symptomatic heart disease with good functional capacity  I am checking an ultrasound scan of her heart and carotid ultrasound scan    I have spent --90---- minutes of time which includes, time spent to prepare to see the patient , obtaining history ,performing examination, counseling/Educating the patient , Documenting clinical information in the record      -  8/31/2023- 1729    EKG report     Sinus tachycardia   LVH with repolarization abnormality ( R in aVL , Sokolow-Grider , [place]   product , [employer]hilt-Ching )   Abnormal ECG   No previous ECGs available    Labs    TSH-N  A1c in the prediabetic range at 5.7  INR, CMP-N  Hb, HCT,PLT-N    Called and spoke to her and   They plan on having the Us scans on 9/8   I anticipate that the carotid ultrasound scan and echocardiogram will come back not concerning for surgery based on her having no heart disease and no suggestions of heart disease with good functional capacity.  If however if that abnormal I will consider cardiac input  --  9/8/2023- 12 -07     Carotid ultrasound scan was requested due to cardiac murmur and right-sided carotid bruit    Carotid Scan reportedly showed        Impression:     Left CCA occlusion.  Reconstituted flow at the left ICA with abnormal waveform.  Collateral flow potentially contributed by the contralateral  right ICA noting velocity elevation with otherwise normal range ICA/CCA ratio.  Further correlation with dedicated CTA imaging can be obtained as warranted.     Majority antegrade flow of the left vertebral artery with partial reversed diastolic flow.    It appears that she has significant carotid artery disease with the left common carotid artery occlusion with possible collateral flow from the right carotid artery  She is not known to have carotid artery disease and has no stroke or mini-stroke history   This carotid artery disease likely to be chronic in nature given the collateralization    Called and spoke to her and   Discussed significant Carotid , likely chronic disease   They want to move forward with joint replacement   Discussed that Will wait for Echo  In the mean time will arrange Cardiology evaluation    --  9/8/2023- 2014     Echocardiogram reassuringly showed       Left Ventricle: The left ventricle is normal in size. Normal wall motion. There is normal systolic function. Ejection fraction by visual approximation is 65%. There is normal diastolic function.    Right Ventricle: Systolic function is normal.    Pulmonary Artery: The estimated pulmonary artery systolic pressure is 32 mmHg.    IVC/SVC: Normal venous pressure at 3 mmHg.  --  9/8/20236733-2468   Called and spoke to her and     Called to follow up ,Doing well ,No changes to Medication, Health      --  9/12/2023- 12 20     Stress test      Normal myocardial perfusion scan. There is no evidence of myocardial ischemia or infarction.    The LVEF is not accurate due to poor software boundary tracking at rest  and poor software boundary tracking during stress. The visually estimated ejection fraction is normal at rest and normal during stress.    There is normal wall motion at rest and post stress.    LV cavity size is normal at rest and normal at stress.    The ECG portion of the study is abnormal but not diagnostic due to resting ST-T  abnormalities.    The patient reported no chest pain during the stress test.    There were no arrhythmias during stress.    There are no prior studies for comparison.    Called and spoke to her,    Will let her proceed   --  9/12/2023- 1641 June 2022      Incidental finding of chronic occlusion of left CCA.       Had Cardiology evaluation     Corresponded to the Anesthesiologist , Surgeon   Watch BP closely      Called to follow up , spoke to her  to address any concerns with the up coming surgery or any questions on Medication instructions -  Doing well ,No health changes  Was commenced on ASA , statin- she plans on starting post op   Discussed preferable to start Pre op   Suggested follow up   Carotid artery disease- Statin, ASA  No stroke, TIA, vision losses  No chest pain

## 2023-08-31 NOTE — H&P
CC:  Left knee pain    Watson Castro is a 68 y.o. female with history of Left knee pain. Pain is worse with activity and weight bearing.  Patient has experienced interference of activities of daily living due to decreased range of motion and an increase in joint pain and swelling.  Patient has failed non-operative treatment including NSAIDs, corticosteroid injections, viscosupplement injections, and activity modification.  Watson Castro currently ambulates independently.     Relevant medical conditions of significance in perioperative period:  Iron deficiency anemia- on vitamins managed by pcp     Given documented medical comorbidities including those listed above and based off of CMS criteria patient would meet inpatient admission status guidelines. This case has been approved as inpatient.     Past Medical History:   Diagnosis Date    Iron deficiency anemia        Past Surgical History:   Procedure Laterality Date    CATARACT EXTRACTION W/  INTRAOCULAR LENS IMPLANT Right 10/11/2022    Procedure: EXTRACTION, CATARACT, WITH IOL INSERTION;  Surgeon: Shai Anaya MD;  Location: [facility];  Service: Ophthalmology;  Laterality: Right;    CATARACT EXTRACTION W/  INTRAOCULAR LENS IMPLANT Left 10/25/2022    Procedure: EXTRACTION, CATARACT, WITH IOL INSERTION;  Surgeon: Shai Anaya MD;  Location: [facility];  Service: Ophthalmology;  Laterality: Left;       Family History   Problem Relation Age of Onset    Amblyopia Neg Hx     Blindness Neg Hx     Cataracts Neg Hx     Glaucoma Neg Hx     Macular degeneration Neg Hx     Retinal detachment Neg Hx     Strabismus Neg Hx        Review of patient's allergies indicates:   Allergen Reactions    Sesame seed Anaphylaxis     Rash    Sulfa (sulfonamide antibiotics) Rash         Current Outpatient Medications:     ascorbic acid, vitamin C, (VITAMIN C) 1000 MG tablet, Take 1,000 mg by mouth once daily., Disp: , Rfl:     diclofenac sodium (VOLTAREN) 1 % Gel, APPLY 2  GRAMS TOPICALLY 3 TIMES DAILY, Disp: 100 g, Rfl: 0    Lactobac no.41/Bifidobact no.7 (PROBIOTIC-10 ORAL), Take 1 tablet by mouth once daily., Disp: , Rfl:     mecobalamin-levomefolate calc (EB-P1 DR) 0.4 mg- 15 mg CpDR, Take 1 capsule by mouth once daily., Disp: 90 capsule, Rfl: 4    naproxen (NAPROSYN) 500 MG tablet, Take 1 tablet (500 mg total) by mouth daily as needed (For Knee pain)., Disp: 90 tablet, Rfl: 4    psyllium seed, with sugar, (FIBER ORAL), Take by mouth., Disp: , Rfl:     zinc gluconate 50 mg tablet, Take 50 mg by mouth once daily., Disp: , Rfl:     Review of Systems:  Constitutional: no fever or chills  Eyes: no visual changes  ENT: no nasal congestion or sore throat  Respiratory: no cough or shortness of breath  Cardiovascular: no chest pain or palpitations  Gastrointestinal: no nausea or vomiting, tolerating diet  Genitourinary: no hematuria or dysuria  Integument/Breast: no rash or pruritis  Hematologic/Lymphatic: no easy bruising or lymphadenopathy  Musculoskeletal: positive for knee pain  Neurological: no seizures or tremors  Behavioral/Psych: no auditory or visual hallucinations  Endocrine: no heat or cold intolerance    PE:  There were no vitals taken for this visit.  General: Pleasant, cooperative, NAD   Gait: antalgic  HEENT: NCAT, sclera nonicteric   Lungs: Respirations clear bilaterally; equal and unlabored.   CV: S1S2; 2+ bilateral upper and lower extremity pulses.   Skin: Intact throughout with no rashes, erythema, or lesions  Extremities: No LE edema,  no erythema or warmth of the skin in either lower extremity.    Left knee exam:  Knee Range of Motion:normal, pain with passive range of motion  Effusion:none  Condition of skin:intact  Location of tenderness:Medial joint line   Strength:normal  Stability: stable to testing    Hip Examination: painless PROM of hip     Radiographs: Radiographs reveal advanced degenerative changes including subchondral cyst formation, subchondral  sclerosis, osteophyte formation, joint space narrowing.     Knee Alignment: normal    Diagnosis: Primary osteoarthritis Left knee    Plan: Left total knee arthroplasty    Due to the serious nature of total joint infection and the high prevalence of community acquired MRSA, vancomycin will be used perioperatively.

## 2023-08-31 NOTE — ASSESSMENT & PLAN NOTE
Suggested working on bowel movements in preparation for surgery   Constipation- I suggest giving laxative regimen as opioid use,reduced ambulation  can increase the constipation     She had no change in bowel habit.  She had no change in the color, consistency or caliber of the stool  I have discussed with her about the importance of colonoscopy    She has no blood in the bowel movement or black tarry stool  No throwing up of any blood or coffee like vomit   no urinary or vaginal bleeding    She was taking over-the-counter iron about 2 years ago which she did not tolerate from an abdominal standpoint and she stopped using them long time ago

## 2023-08-31 NOTE — ASSESSMENT & PLAN NOTE
Clinically she has no suggestions of symptomatic heart disease with good functional capacity  I am checking an ultrasound scan of her heart   I suspect she probably has aortic valve sclerosis    Echo     Aortic Valve The aortic valve is structurally normal. There is normal leaflet mobility. Aortic valve peak velocity is 1.58 m/s. Mean gradient is 6 mmHg.   Mitral Valve The mitral valve is structurally normal. There is normal leaflet mobility. The mean pressure gradient across the mitral valve is 2 mmHg at a heart rate of  bpm.   Tricuspid Valve The tricuspid valve is structurally normal. There is normal leaflet mobility. There is trace regurgitation.   Pulmonic Valve The pulmonic valve is structurally normal. There is normal leaflet mobility.

## 2023-08-31 NOTE — ASSESSMENT & PLAN NOTE
Prediabetes- I suggest monitoring the glucose in the perioperative period as  glucose may be high from stress hyperglycemia in which case Insulin may be required    Hemoglobin A1c in the pre diabetic range   Weight loss with diet and exercise , if able helps lower A1c  Increased risk of becoming a diabetic   Will benefit  follow up

## 2023-08-31 NOTE — ASSESSMENT & PLAN NOTE
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 with no symptoms    She had Abnormal EKG in the past  Also   Normal sinus rhythm Right atrial enlargement Marked ST abnormality, possible inferolateral subendocardial injury Abnormal ECG No previous ECGs available Confirmed by Michael Rendon ([id number]) on 6/23/2022 7:12:27 PM        She had an EKG in the setting of her passing out when her  was sick in June of 2022   She was stressed at that time   Prior to passing out she would not have any chest pain, chest tightness short of breath or heart racing  Her troponin was normal at that time  She never had chest discomfort    I have discussed the abnormal EKG with her and her    She clinically does not have any heart related concerns   I have offered her an ultrasound scan of the heart, cardiology evaluation  The took the ultrasound scan of the heart

## 2023-08-31 NOTE — PROGRESS NOTES
Watson Castro is a 68 y.o. year old here today for pre surgery optimization visit  in preparation for a Left total knee arthroplasty to be performed by Dr. Harris  on 9/13/2023.  she was last seen and treated in the clinic on 8/31/2023. she will be medically optimized by the pre op center. There has been no significant change in medical status since last visit. No fever, chills, malaise, or unexplained weight change.      Allergies, Medications, past medical and surgical history reviewed.    Focused examination performed.    Patient saw surgeon in clinic today. All questions answered. Patient encouraged to call with questions. Contact information given.     Pre, armando, and post operative procedures and expectations discussed. Goals of successful surgery reviewed and include manageable pain levels, surgical site free of infection, medication management, and ambulation with PT/OT assistance. Healthy weight management discussed with patient and caregiver who were receptive to eduction of healthy diet and activity. No other necessary lifestyle changes identified. Educated patient about signs and symptoms of infection, medication management, anticoagulation therapy, risk of tobacco and alcohol use, and self-care to promote healing. Surgical guide given for future reference. Hibiclens given to patient with instructions. All questions were answered.     Watson Castro verbalized an understanding to the education and goals. Patient has displayed readiness to engage in care and is ready to proceed with surgery.  Patient reports her family is able and ready to provide assistance at home after discharge.    Surgical and blood consents signed.    Watson Castro will contact us if there are any questions, concerns, or changes in medical status prior to surgery.       Joint class: 8/22    Patient has discussed discharge planning with surgeon. Patient will be discharged to home following surgery.   patient will be scheduled  with [facility].     30 minutes of time was spent on patient education, review of records, templating, H&P, , appointment scheduling and optimizing patient for surgery.

## 2023-08-31 NOTE — HPI
History of present illness- I had the pleasure of meeting this pleasant 68 y.o. lady in the pre op clinic prior to her elective Orthopedic surgery. The patient is new to me . Mrs Chaudhari was accompanied by Mr Vigil.    I have obtained the history by speaking to the patient and by reviewing the electronic health records.    Events leading up to surgery / History of presenting illness -    I have obtained the history by speaking to the patient and her caring  in the office    She has been troubled with severe  Left knee  pain for 1 year .  It increased over time .   Pain increases with  putting weight , weight-bearing and when she gets up from sitting to standing position and when she gets out of her bed  and decreases with resting.  She takes naproxen about 1 or 2 in a day for Left knee pain- for 1 year     She works for [employer] in the [employer] which involves physical activity and involves lifting heavier objects and she is on her feet a lot  She is currently off work since August 7, 2023 due to the left knee pain    She is having the surgery through the [facility]    She started off with having pain on the opposite knee and she was putting more weight on the left knee which is causing her more problem now    She has not had any previous left knee surgery    Relevant health conditions of significance for the perioperative period/ History of presenting illness -    Subjectively describes health as good     Not known to have heart disease , Diabetes Mellitus, HTN,  Lung disease   , Thyroid problem, DVT, PE, COVID    She lives with her  in a two-level house and their bedroom is downstairs  They have 2 grown children a daughter and a son  Their son is currently living with them   Her  is a retired physical therapist who retired about 3 years ago  She has help available to recover after surgery

## 2023-08-31 NOTE — OUTPATIENT SUBJECTIVE & OBJECTIVE
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     Review of Systems   Constitutional:  Negative for chills and fever.   HENT:          STOPBANG score  / 8    Loud Snoring     Age over 50     No sinus problem   Eyes:           \she had cataract surgery both eyes last year October 2022and is doing good   Respiratory:          No cough , phlegm    No Hemoptysis   Cardiovascular:         As noted   Gastrointestinal:         No overt GI/ blood losses  Bowel movements- Regular / constipated   Endocrine:        Prednisone use > 20 mg daily for 3 weeks- none   Genitourinary:  Negative for dysuria.        No urinary hesitancy    Musculoskeletal:         As above   Left > Rt  knee pain   Skin:  Negative for rash.   Neurological:  Negative for syncope.        No unilateral weakness   Hematological:         Current use of Anticoagulants  None   Psychiatric/Behavioral:          No Depression,Anxiety  Depression, Anxiety - Controlled   Anxiety about up coming surgery   No SI/HI   No vascular stenting             No anesthesia, bleeding, cardiac problems, PONV with previous surgeries/procedures.  Medications and Allergies reviewed in epic.   FH- No anesthesia,bleeding / venous thrombosis ,  heart disease in family      Physical Exam  Blood pressure (!) 164/86, pulse 86, temperature 97.8 °F (36.6 °C), temperature source Oral, resp. rate 16, height 5' (1.524 m), weight 53.1  kg (117 lb), SpO2 98 %.  Home -140/70-80  No headache  I offered a blanketn and the presence of a chaperone during physical examination   She was comfortable to proceed with the exam without the the presence of a chaperone        Physical Exam  Constitutional- Vitals - Body mass index is 22.85 kg/m².,   Vitals:    08/31/23 1102   BP: (!) 164/86   Pulse: 86   Resp: 16   Temp: 97.8 °F (36.6 °C)     General appearance-Conscious,Coherent  Eyes- No conjunctival icterus,pupils  round  and  bilateral intra ocular lenses  ENT-Oral cavity- moist    , Hearing grossly normal   Neck- No thyromegaly ,Trachea -central, No jugular venous distension,    Rt carotid bruit   Cardiovascular -Heart Sounds- Auscultated Sitting , Reclining ,  systolic murmur aortic area ,  systolic murmur pulmonary area , and systolic murmur tricuspid area   , No gallop rhythm   Respiratory - Normal Respiratory Effort, Normal breath sounds,  no wheeze , and  no forced expiratory wheeze    Peripheral pitting pedal edema-- none , no calf pain   Gastrointestinal -Soft abdomen, No palpable masses, Non Tender,Liver,Spleen not palpable. No-- free fluid and shifting dullness  Musculoskeletal- No finger Clubbing. Strength grossly normal   Lymphatic-No Palpable cervical, axillary,Inguinal lymphadenopathy   Psychiatric - normal effect,Orientation  Rt Dorsalis pedis pulses-palpable    Lt Dorsalis pedis pulses- palpable   Rt Posterior tibial pulses -palpable   Left posterior tibial pulses -palpable   Miscellaneous -  Surgical scarUmbilical area  and  no renal bruit   Investigations  Lab and Imaging have been reviewed in epic.      Review of old records- Was done and information gathered regards to events leading to surgery and health conditions of significance in the perioperative period.    Outpatient Subjective & Objective

## 2023-08-31 NOTE — ASSESSMENT & PLAN NOTE
She has carotid bruit on the right side   She had no stroke or mini-stroke    Carotid US scan showed   Left CCA occlusion.  Reconstituted flow at the left ICA with abnormal waveform.  Collateral flow potentially contributed by the contralateral right ICA noting velocity elevation with otherwise normal range ICA/CCA ratio.  Further correlation with dedicated CTA imaging can be obtained as warranted.     Majority antegrade flow of the left vertebral artery with partial reversed diastolic flow

## 2023-08-31 NOTE — ASSESSMENT & PLAN NOTE
She has been taking naproxen for about 1 year as her job involves being on her feet   She has no stomach upset or ulcer  She is trying to reduce the naproxen in preparation for surgery  She plans on holding naproxen 1 week before surgery

## 2023-08-31 NOTE — ASSESSMENT & PLAN NOTE
Possible sleep apnea- I suggest a sleep study and suggest caution with usage of medication that can cause respiratory suppression in the perioperative period  potential ramifications of untreated sleep apnea, which could include daytime sleepiness, hypertension, heart disease and stroke were discussed    Avoidance of  supine sleep, weight gain , alcoholic beverages , care with , sedative , CNS depressant use indicated  since all of these can worsen JORDI

## 2023-08-31 NOTE — TELEPHONE ENCOUNTER
Met with patient during visit in clinic.  Discussed all pre/post op instructions - hibiclens given and use explained, NPO after midnight night before surgery, expectations post op, bruising, swelling, ice, elevation, p/o d/c meds, DME, bowel mgmt, role of caregiver in patient's care, dressing over incision, PT, blue armband and purpose.        Pt scheduled for echo and u/s next Friday 9/8.

## 2023-08-31 NOTE — PROGRESS NOTES
Subjective:      Patient ID: Watson Castro is a 68 y.o. female.    Chief Complaint:   Pre-op Exam and Pain of the Left Knee    HPI  She came in today with her  for a Meet and Greet in preparation for a STEWART left TKA to be done on 09/13/2023.  Please see the separate H&P note from today for further information.      The patient's pathophysiology was explained in detail with reference to x-rays, models, other visual aids as appropriate.  Treatment options were discussed in detail.  Questions were invited and answered to the patient's satisfaction.      Iam Harris MD  Orthopedic Surgery

## 2023-09-04 ENCOUNTER — PATIENT MESSAGE (OUTPATIENT)
Dept: ADMINISTRATIVE | Facility: OTHER | Age: 68
End: 2023-09-04
Payer: COMMERCIAL

## 2023-09-08 ENCOUNTER — HOSPITAL ENCOUNTER (OUTPATIENT)
Dept: RADIOLOGY | Facility: HOSPITAL | Age: 68
Discharge: HOME | End: 2023-09-08
Attending: HOSPITALIST
Payer: COMMERCIAL

## 2023-09-08 ENCOUNTER — HOSPITAL ENCOUNTER (OUTPATIENT)
Dept: CARDIOLOGY | Facility: HOSPITAL | Age: 68
Discharge: HOME | End: 2023-09-08
Attending: HOSPITALIST
Payer: COMMERCIAL

## 2023-09-08 VITALS — WEIGHT: 117 LBS | HEIGHT: 60 IN | BODY MASS INDEX: 22.97 KG/M2

## 2023-09-08 DIAGNOSIS — R01.1 CARDIAC MURMUR: ICD-10-CM

## 2023-09-08 DIAGNOSIS — R94.31 ABNORMAL EKG: ICD-10-CM

## 2023-09-08 DIAGNOSIS — R09.89 BRUIT OF RIGHT CAROTID ARTERY: ICD-10-CM

## 2023-09-08 LAB
ASCENDING AORTA: 2.98 CM
AV INDEX (PROSTH): 1
AV MEAN GRADIENT: 6 MMHG
AV PEAK GRADIENT: 10 MMHG
AV REGURGITATION PRESSURE HALF TIME: 656.08 MS
AV VALVE AREA BY VELOCITY RATIO: 2.21 CM²
AV VALVE AREA: 2.4 CM²
AV VELOCITY RATIO: 0.92
BSA FOR ECHO PROCEDURE: 1.5 M2
CV ECHO LV RWT: 0.99 CM
DOP CALC AO PEAK VEL: 1.58 M/S
DOP CALC AO VTI: 36.8 CM
DOP CALC LVOT AREA: 2.4 CM2
DOP CALC LVOT DIAMETER: 1.75 CM
DOP CALC LVOT PEAK VEL: 1.45 M/S
DOP CALC LVOT STROKE VOLUME: 88.23 CM3
DOP CALC MV VTI: 21.3 CM
DOP CALCLVOT PEAK VEL VTI: 36.7 CM
E WAVE DECELERATION TIME: 152.02 MSEC
E/A RATIO: 0.84
E/E' RATIO: 17.8 M/S
ECHO LV POSTERIOR WALL: 1.44 CM (ref 0.6–1.1)
EJECTION FRACTION: 65 %
FRACTIONAL SHORTENING: 31 % (ref 28–44)
INTERVENTRICULAR SEPTUM: 1.74 CM (ref 0.6–1.1)
IVC DIAMETER: 0.75 CM
LA MAJOR: 3.76 CM
LEFT ATRIUM SIZE: 3.25 CM
LEFT ATRIUM VOLUME INDEX MOD: 20.8 ML/M2
LEFT ATRIUM VOLUME MOD: 30.98 CM3
LEFT INTERNAL DIMENSION IN SYSTOLE: 2 CM (ref 2.1–4)
LEFT VENTRICLE DIASTOLIC VOLUME INDEX: 21.67 ML/M2
LEFT VENTRICLE DIASTOLIC VOLUME: 32.29 ML
LEFT VENTRICLE MASS INDEX: 112 G/M2
LEFT VENTRICLE SYSTOLIC VOLUME INDEX: 8.6 ML/M2
LEFT VENTRICLE SYSTOLIC VOLUME: 12.76 ML
LEFT VENTRICULAR INTERNAL DIMENSION IN DIASTOLE: 2.9 CM (ref 3.5–6)
LEFT VENTRICULAR MASS: 167.31 G
LV LATERAL E/E' RATIO: 14.83 M/S
LV SEPTAL E/E' RATIO: 22.25 M/S
LVOT MG: 5.08 MMHG
LVOT MV: 1.08 CM/S
MV MEAN GRADIENT: 2 MMHG
MV PEAK A VEL: 1.06 M/S
MV PEAK E VEL: 0.89 M/S
MV PEAK GRADIENT: 4 MMHG
MV STENOSIS PRESSURE HALF TIME: 44.09 MS
MV VALVE AREA BY CONTINUITY EQUATION: 4.14 CM2
MV VALVE AREA P 1/2 METHOD: 4.99 CM2
OHS LV EJECTION FRACTION SIMPSONS BIPLANE MOD: 60 %
PISA AR MAX VEL: 3.24 M/S
PISA TR MAX VEL: 2.7 M/S
PULM VEIN S/D RATIO: 1.46
PV MV: 0.81 M/S
PV PEAK D VEL: 0.37 M/S
PV PEAK GRADIENT: 4 MMHG
PV PEAK S VEL: 0.54 M/S
PV PEAK VELOCITY: 1.06 M/S
RA MAJOR: 3.94 CM
RA PRESSURE ESTIMATED: 3 MMHG
RA WIDTH: 2.33 CM
RIGHT VENTRICULAR END-DIASTOLIC DIMENSION: 1.83 CM
RV TB RVSP: 6 MMHG
RV TISSUE DOPPLER FREE WALL SYSTOLIC VELOCITY 1 (APICAL 4 CHAMBER VIEW): 10.06 CM/S
STJ: 2.68 CM
TDI LATERAL: 0.06 M/S
TDI SEPTAL: 0.04 M/S
TDI: 0.05 M/S
TR MAX PG: 29 MMHG
TRICUSPID ANNULAR PLANE SYSTOLIC EXCURSION: 1.73 CM
TV REST PULMONARY ARTERY PRESSURE: 32 MMHG
Z-SCORE OF LEFT VENTRICULAR DIMENSION IN END DIASTOLE: -4.22
Z-SCORE OF LEFT VENTRICULAR DIMENSION IN END SYSTOLE: -2.5

## 2023-09-08 PROCEDURE — 93880 US CAROTID BILATERAL: ICD-10-PCS | Mod: 26,COE,, | Performed by: RADIOLOGY

## 2023-09-08 PROCEDURE — 93306 TTE W/DOPPLER COMPLETE: CPT | Mod: 26,COE,, | Performed by: INTERNAL MEDICINE

## 2023-09-08 PROCEDURE — 93306 ECHO (CUPID ONLY): ICD-10-PCS | Mod: 26,COE,, | Performed by: INTERNAL MEDICINE

## 2023-09-08 PROCEDURE — 93880 EXTRACRANIAL BILAT STUDY: CPT | Mod: 26,COE,, | Performed by: RADIOLOGY

## 2023-09-08 PROCEDURE — 93880 EXTRACRANIAL BILAT STUDY: CPT | Mod: TC

## 2023-09-08 PROCEDURE — 93306 TTE W/DOPPLER COMPLETE: CPT

## 2023-09-11 ENCOUNTER — PATIENT MESSAGE (OUTPATIENT)
Dept: ADMINISTRATIVE | Facility: OTHER | Age: 68
End: 2023-09-11
Payer: COMMERCIAL

## 2023-09-11 ENCOUNTER — OFFICE VISIT (OUTPATIENT)
Dept: CARDIOLOGY | Facility: CLINIC | Age: 68
End: 2023-09-11
Payer: COMMERCIAL

## 2023-09-11 VITALS
BODY MASS INDEX: 22.95 KG/M2 | WEIGHT: 116.88 LBS | HEIGHT: 60 IN | DIASTOLIC BLOOD PRESSURE: 92 MMHG | HEART RATE: 92 BPM | SYSTOLIC BLOOD PRESSURE: 142 MMHG

## 2023-09-11 DIAGNOSIS — R09.89 BRUIT OF RIGHT CAROTID ARTERY: ICD-10-CM

## 2023-09-11 DIAGNOSIS — R01.1 CARDIAC MURMUR: ICD-10-CM

## 2023-09-11 DIAGNOSIS — E78.2 MIXED HYPERLIPIDEMIA: ICD-10-CM

## 2023-09-11 DIAGNOSIS — Z01.818 PREOPERATIVE CLEARANCE: Primary | ICD-10-CM

## 2023-09-11 DIAGNOSIS — R94.31 ABNORMAL EKG: ICD-10-CM

## 2023-09-11 DIAGNOSIS — I65.22 OCCLUSION OF LEFT CAROTID ARTERY: ICD-10-CM

## 2023-09-11 PROCEDURE — 99204 PR OFFICE/OUTPT VISIT, NEW, LEVL IV, 45-59 MIN: ICD-10-PCS | Mod: S$GLB,COE,, | Performed by: INTERNAL MEDICINE

## 2023-09-11 PROCEDURE — 99999 PR PBB SHADOW E&M-EST. PATIENT-LVL IV: CPT | Mod: PBBFAC,COE,, | Performed by: INTERNAL MEDICINE

## 2023-09-11 PROCEDURE — 99999 PR PBB SHADOW E&M-EST. PATIENT-LVL IV: ICD-10-PCS | Mod: PBBFAC,COE,, | Performed by: INTERNAL MEDICINE

## 2023-09-11 PROCEDURE — 99204 OFFICE O/P NEW MOD 45 MIN: CPT | Mod: S$GLB,COE,, | Performed by: INTERNAL MEDICINE

## 2023-09-11 RX ORDER — ASPIRIN 81 MG/1
81 TABLET ORAL DAILY
Refills: 0
Start: 2023-09-11 | End: 2024-09-10

## 2023-09-11 RX ORDER — ROSUVASTATIN CALCIUM 20 MG/1
20 TABLET, COATED ORAL NIGHTLY
Qty: 30 TABLET | Refills: 11 | Status: SHIPPED | OUTPATIENT
Start: 2023-09-11 | End: 2023-11-27 | Stop reason: SDUPTHER

## 2023-09-11 NOTE — PATIENT INSTRUCTIONS
"I recommend the book, "The Obesity Code" for weight loss; it recommends intermittent fasting and avoidance of sugar, artificial sweeteners and refined carbohydrates.    Also, here is information on a Mediterranean type diet including fish, the pesco-mediterranean diet from the American College of Cardiology:    1.  Humans are evolutionarily adapted to obtain calories and nutrients from both plant and animal food sources. Many people overconsume animal products, often-processed meats high in saturated fats and chemical additives. In contrast, while strict veganism has gained popularity for many reasons and has value in certain groups, it can cause nutritional deficiencies (vitamin B12, high-quality proteins, iron, zinc, omega-3 fatty acid, vitamin D, and calcium), and predispose to osteopenia, loss of muscle mass, and anemia. This is not true of a lacto-ovo vegetarian diet, which allows no animal-based food except for eggs and dairy. A 6-year study of [id number] [place] [employer]ists reported a decreased incidence of all-cause mortality when comparing vegetarians with nonvegetarians. However, when the vegetarians were stratified into vegans, lacto-ovo vegetarians, pesco-vegetarians, and semi-vegetarians, the pesco-vegetarians had lowest risks for all-cause mortality, cardiovascular disease (CVD) mortality, and mortality from other causes.     2.  The authors propose a plant-rich diet rich in nuts with fish and seafood as the principle source of animal food. Known as the Pesco-Mediterranean diet, it is supplemented with extra-virgin olive oil (EVOO), which is the principle fat source, along with moderate amounts of dairy (particularly yogurt and cheese) and eggs, as well as modest amounts of alcohol consumption (ideally red wine with the evening meal), but few red and processed meats.     3.  Both epidemiological studies and randomized clinical trials indicate that the traditional Mediterranean diet is associated with " lower risks for all-cause and CVD mortality, coronary heart disease, metabolic syndrome, diabetes, cognitive decline, neurodegenerative diseases (including Alzheimers), depression, overall cancer mortality, and breast and colorectal cancers.     4.  The traditional Mediterranean diet has been endorsed in the most recent Dietary Guidelines for Americans and the American College of Cardiology/American Heart Association guidelines. The 2020 U.S. News & World Report ranked it #1 for overall health based upon it being nutritious, safe, relatively easy to follow, protective against CVD and diabetes, and effective for weight loss.     5.  Fish and seafood are important sources of vitamins protein and omega-3 fatty acids, of which the higher blood and adipose tissue are associated with reduced fatal and nonfatal myocardial infarction. When not fried, fish consumption has been associated with reduced risk of heart failure, and the incidence of the metabolic syndrome, coronary heart disease, ischemic stroke, and sudden cardiac death, particularly when seafood replaces less healthy foods.     6.  Unrestricted use of olive oil in the kitchen, on salads (with vinegar), cooking vegetables, and at the table is the foundation of the traditional Mediterranean diet, although olive oil quality is crucial, which makes it expensive. EVOO retains hydrophilic components of olives including highly bioactive polyphenols, which are believed to underlie many of EVOOs cardiometabolic benefits, such as reduced low-density lipoprotein cholesterol (LDL-C) and increased high-density lipoprotein cholesterol (HDL-C), improved vascular reactivity, enhanced HDL particle functionality, and a lower diabetes risk.     7.  Tree nuts, an integral component of the traditional Mediterranean diet, are nutrient dense rich in unsaturated fats, fiber, protein, polyphenols, phytosterols, and tocopherols. Nut consumption is associated with decreased incidence  and mortality rates from both CVD and coronary artery disease (CAD), as well as atrial fibrillation and diabetes. Randomized controlled trials have shown that diets enriched with nuts produce cardiometabolic benefits including improvements in insulin sensitivity, LDL-C, inflammation, and vascular reactivity. A 1-daily serving of mixed nuts resulted in a 28% reduction in CVD risk. Generous intake of nuts does not promote weight gain because of increased satiety and incomplete digestion.     8.  Legumes are an excellent source of vegetable protein, folate, and magnesium and fiber, and like other seeds including peanuts, are rich in polyphenols. Consumption of legumes has been linked to a reduced risk of incident and fatal CVD and CAD, as well as improvements in blood glucose, cholesterol, blood pressure, and body weight. Legumes, like fish, are a satiating and healthy substitute for red meat and processed meats.     9.  Dairy products and eggs are important sources of protein, nonsodium minerals, probiotics, and vitamin D. Although there is no clear consensus among nutrition experts on the role of dairy products in CVD risk, they are allowed in this Pesco-Mediterranean diet. Fermented low-fat versions, such as yogurt and soft cheeses, are preferred; butter and hard cheese are high in saturated fats and salt.     10.  Eggs are composed of beneficial nutrients including all essential amino acids, in addition to minerals (selenium, phosphorus, iodine, zinc), vitamins (A, D, B2, B12, niacin), and carotenoids (lutein, zeaxanthin). Although each yolk contains about 184 mg of dietary cholesterol, large prospective cohorts suggest that egg consumption is unrelated to serum cholesterol and does not increase CVD risk. Eggs are allowed in the Pesco-Mediterranean diet; egg whites are unlimited and preferably no more than 5 yolks/week.     11.  Whole grains, such as barley, whole oats, rye, corn, buckwheat, brown rice, and quinoa,  are an integral part of the traditional Mediterranean diet. Pasta is an example of a starchy food that has a low glycemic index despite being a refined carbohydrate. In the context of a low glycemic index dietary pattern such as the Mediterranean diet, pasta does not adversely affect adiposity and may even help reduce body weight and there is no evidence that pasta promotes cardiometabolic risk factors. White rice is associated with type 2 diabetes mellitus in Asians but not in Western cohorts, possibly because it is cooked and served plain in [place] and in Western cultures cooked in mixed dishes with vegetables and vegetable oil including EVOO.     12.  The staple beverage of the Pesco-Mediterranean diet is water--which can be flavored but not sweetened. Unsweetened tea and coffee are rich in antioxidants and are associated with improved CVD outcomes. If alcohol is consumed at all, dry red wine is recommended, with the ideal amount being a single glass (6 oz) for women and 1 or 2 glasses/day for men (6-12 oz) consumed with meals.     13.  Time-restricted eating, a type of intermittent fasting, is the practice of limiting the daily intake of calories to a window of time usually between 6-12 hours each day. Intermittent fasting when done on a regular basis has been shown to decrease intra-abdominal adipose tissue and reduce free-radical production. This elicits powerful cellular responses that improve glucose metabolism and reduce systemic inflammation, and may also reduce risks of diabetes, CVD, cancer, and neurodegenerative diseases. After a 12-hour overnight fast, insulin levels are typically low, and glycogen stores have been depleted. In this fasted state, the body starts mobilizing fatty acids from adipose cells to burn as metabolic fuel instead of glucose. This improves insulin sensitivity. Time-restricted eating is not more effective for weight loss than standard calorie-restriction, but appears to enhance CV  health even in nonobese people. Fasting may also lower blood pressure and resting heart rate and improve autonomic balance with augmented heart rate variability.     14.  The evidence regarding time-restricted eating is mostly based on animal models and observational human studies. The most popular form of time-restricted eating involves eating two rather than three meals and compressing the calorie-consumption window. No head-to-head studies have been performed to assess the optimal time window.

## 2023-09-11 NOTE — PROGRESS NOTES
Addendum 09/12/2023:     Normal myocardial perfusion scan. There is no evidence of myocardial ischemia or infarction.    The LVEF is not accurate due to poor software boundary tracking at rest  and poor software boundary tracking during stress. The visually estimated ejection fraction is normal at rest and normal during stress.    There is normal wall motion at rest and post stress.    LV cavity size is normal at rest and normal at stress.    The ECG portion of the study is abnormal but not diagnostic due to resting ST-T abnormalities.    The patient reported no chest pain during the stress test.    There were no arrhythmias during stress.    There are no prior studies for comparison.    Patient cleared for surgery.      JENNIFER Hu MD       Subjective:   09/11/2023     Patient ID:  Watson Castro is a 68 y.o. female who presents for evaulation of No chief complaint on file.      Patient with no exertional chest pains or tightness, but physically limited by knee pain, knee replacement scheduled.  She does not have a history of heart problems, no family history of heart problems.  She does not have hypertension or diabetes.  A CT scan of the chest done last year showed incidental finding of occluded left carotid.  Carotid ultrasound shows an occluded left carotid.  This has been asymptomatic, no unilateral weakness numbness or tingling.    Family history is negative for coronary atherosclerosis doses.  No personal history of hypertension or diabetes.        Past Medical History:   Diagnosis Date    Iron deficiency anemia        Review of patient's allergies indicates:   Allergen Reactions    Sesame seed Anaphylaxis     Rash    Sulfa (sulfonamide antibiotics) Rash         Current Outpatient Medications:     ascorbic acid, vitamin C, (VITAMIN C) 1000 MG tablet, Take 1,000 mg by mouth once daily., Disp: , Rfl:     diclofenac sodium (VOLTAREN) 1 % Gel, APPLY 2 GRAMS TOPICALLY 3 TIMES DAILY, Disp: 100 g, Rfl: 0     Lactobac no.41/Bifidobact no.7 (PROBIOTIC-10 ORAL), Take 1 tablet by mouth once daily., Disp: , Rfl:     mecobalamin-levomefolate calc (EB-P1 DR) 0.4 mg- 15 mg CpDR, Take 1 capsule by mouth once daily., Disp: 90 capsule, Rfl: 4    naproxen (NAPROSYN) 500 MG tablet, Take 1 tablet (500 mg total) by mouth daily as needed (For Knee pain)., Disp: 90 tablet, Rfl: 4    psyllium seed, with sugar, (FIBER ORAL), Take by mouth., Disp: , Rfl:     zinc gluconate 50 mg tablet, Take 50 mg by mouth once daily., Disp: , Rfl:     aspirin (ECOTRIN) 81 MG EC tablet, Take 1 tablet (81 mg total) by mouth once daily., Disp: , Rfl: 0    rosuvastatin (CRESTOR) 20 MG tablet, Take 1 tablet (20 mg total) by mouth every evening., Disp: 30 tablet, Rfl: 11     Objective:   Review of Systems   Cardiovascular:  Negative for chest pain, claudication, cyanosis, dyspnea on exertion, irregular heartbeat, leg swelling, near-syncope, orthopnea, palpitations, paroxysmal nocturnal dyspnea and syncope.         Vitals:    09/11/23 1141   BP: (!) 142/92   Pulse: 92     Wt Readings from Last 3 Encounters:   09/11/23 53 kg (116 lb 13.5 oz)   09/08/23 53.1 kg (117 lb)   08/31/23 53.1 kg (117 lb 2.8 oz)     Temp Readings from Last 3 Encounters:   08/31/23 97.8 °F (36.6 °C) (Oral)   11/23/22 98 °F (36.7 °C) (Oral)   10/25/22 97.7 °F (36.5 °C) (Oral)     BP Readings from Last 3 Encounters:   09/11/23 (!) 142/92   08/31/23 (!) 164/86   11/23/22 126/82     Pulse Readings from Last 3 Encounters:   09/11/23 92   08/31/23 86   11/23/22 94             Physical Exam  Vitals reviewed.   Constitutional:       General: She is not in acute distress.     Appearance: She is well-developed.   HENT:      Head: Normocephalic and atraumatic.      Nose: Nose normal.   Eyes:      Conjunctiva/sclera: Conjunctivae normal.      Pupils: Pupils are equal, round, and reactive to light.   Neck:      Vascular: Carotid bruit (right greater than left) present. No JVD.   Cardiovascular:       Rate and Rhythm: Normal rate and regular rhythm.      Pulses: Normal pulses and intact distal pulses.      Heart sounds: Normal heart sounds. No murmur heard.     No friction rub. No gallop.   Pulmonary:      Effort: Pulmonary effort is normal. No respiratory distress.      Breath sounds: Normal breath sounds. No wheezing or rales.   Chest:      Chest wall: No tenderness.   Abdominal:      General: Bowel sounds are normal. There is no distension.      Palpations: Abdomen is soft.      Tenderness: There is no abdominal tenderness.   Musculoskeletal:         General: No tenderness or deformity. Normal range of motion.      Cervical back: Normal range of motion and neck supple.      Right lower leg: No edema.      Left lower leg: No edema.   Skin:     General: Skin is warm and dry.      Findings: No erythema or rash.   Neurological:      Mental Status: She is alert and oriented to person, place, and time.      Cranial Nerves: No cranial nerve deficit.      Motor: No abnormal muscle tone.      Coordination: Coordination normal.   Psychiatric:         Behavior: Behavior normal.         Thought Content: Thought content normal.         Judgment: Judgment normal.           Lab Results   Component Value Date    CHOL 223 (H) 11/18/2022    CHOL 197 11/05/2021    CHOL 198 08/28/2019     Lab Results   Component Value Date    HDL 49 11/18/2022    HDL 46 11/05/2021    HDL 40 08/28/2019     Lab Results   Component Value Date    LDLCALC 139.4 11/18/2022    LDLCALC 109.4 11/05/2021    LDLCALC 131.0 08/28/2019     Lab Results   Component Value Date    ALT 15 08/31/2023    AST 17 08/31/2023    AST 19 11/18/2022    AST 26 11/05/2021     Lab Results   Component Value Date    CREATININE 0.8 08/31/2023    BUN 15 08/31/2023     08/31/2023    K 4.4 08/31/2023    CO2 25 08/31/2023    CO2 25 11/18/2022    CO2 24 11/05/2021     Lab Results   Component Value Date    HGB 12.7 08/31/2023    HCT 39.7 08/31/2023    HCT 41.6 11/18/2022    HCT  37.5 11/05/2021               EKG shows on independent review sinus tachycardia with LVH and ST abnormality.          Assessment and Plan:     Preoperative clearance  Comments:  Will obtain nuclear stress test prior to clearance  Orders:  -     Nuclear Stress - Cardiology Interpreted; Future; Expected date: 09/11/2023    Occlusion of left carotid artery  Comments:  Appears asymptomatic, chronic, initially noted in 2022  Recommend aspirin 81 mg daily  Orders:  -     Ambulatory referral/consult to Cardiology  -     rosuvastatin (CRESTOR) 20 MG tablet; Take 1 tablet (20 mg total) by mouth every evening.  Dispense: 30 tablet; Refill: 11  -     aspirin (ECOTRIN) 81 MG EC tablet; Take 1 tablet (81 mg total) by mouth once daily.; Refill: 0  -     Lipoprotein A (LPA); Future; Expected date: 12/11/2023  -     Lipid Panel; Future; Expected date: 12/11/2023  -     Apolipoprotein B; Future; Expected date: 12/11/2023    Abnormal EKG  -     Nuclear Stress - Cardiology Interpreted; Future; Expected date: 09/11/2023    Bruit of right carotid artery    Cardiac murmur  Comments:  Echocardiogram shows no significant abnormalities    Mixed hyperlipidemia  Comments:  Aggressive lipid lowering therapy indicated, LDL goal less than 50 mg/dL, begin high-dose statin therapy       Obtain nuclear stress test to clear for surgery  Follow up in about 4 months (around 1/11/2024).          Future Appointments   Date Time Provider Department Center   9/15/2023  9:15 AM Sarah Beth Ignacio, PT [facility]   9/15/2023  3:30 PM TELEMED NURSE, [facility] [address]   9/18/2023  9:15 AM Sarah Beth Ignacio PT [facility]   9/19/2023  9:00 AM Sarah Beth Ignacio PT [facility]   9/26/2023 10:20 AM Nelson, [place], NP [facility] [address]

## 2023-09-12 ENCOUNTER — TELEPHONE (OUTPATIENT)
Dept: ORTHOPEDICS | Facility: CLINIC | Age: 68
End: 2023-09-12
Payer: COMMERCIAL

## 2023-09-12 ENCOUNTER — HOSPITAL ENCOUNTER (OUTPATIENT)
Dept: CARDIOLOGY | Facility: HOSPITAL | Age: 68
Discharge: HOME | End: 2023-09-12
Attending: INTERNAL MEDICINE
Payer: COMMERCIAL

## 2023-09-12 ENCOUNTER — ANESTHESIA EVENT (OUTPATIENT)
Dept: SURGERY | Facility: HOSPITAL | Age: 68
DRG: 470 | End: 2023-09-12
Payer: COMMERCIAL

## 2023-09-12 VITALS
WEIGHT: 116 LBS | HEIGHT: 60 IN | DIASTOLIC BLOOD PRESSURE: 85 MMHG | HEART RATE: 77 BPM | BODY MASS INDEX: 22.78 KG/M2 | SYSTOLIC BLOOD PRESSURE: 116 MMHG

## 2023-09-12 DIAGNOSIS — Z01.818 PREOPERATIVE CLEARANCE: ICD-10-CM

## 2023-09-12 DIAGNOSIS — R94.31 ABNORMAL EKG: ICD-10-CM

## 2023-09-12 PROBLEM — I65.22 OCCLUSION OF LEFT CAROTID ARTERY: Status: ACTIVE | Noted: 2023-09-12

## 2023-09-12 LAB
CV STRESS BASE HR: 77 BPM
DIASTOLIC BLOOD PRESSURE: 85 MMHG
EJECTION FRACTION- HIGH: 65 %
END DIASTOLIC INDEX-HIGH: 153 ML/M2
END DIASTOLIC INDEX-LOW: 93 ML/M2
END SYSTOLIC INDEX-HIGH: 71 ML/M2
END SYSTOLIC INDEX-LOW: 31 ML/M2
OHS CV CPX 1 MINUTE RECOVERY HEART RATE: 117 BPM
OHS CV CPX 85 PERCENT MAX PREDICTED HEART RATE MALE: 126
OHS CV CPX MAX PREDICTED HEART RATE: 148
OHS CV CPX PATIENT IS FEMALE: 1
OHS CV CPX PATIENT IS MALE: 0
OHS CV CPX PEAK DIASTOLIC BLOOD PRESSURE: 85 MMHG
OHS CV CPX PEAK HEAR RATE: 108 BPM
OHS CV CPX PEAK RATE PRESSURE PRODUCT: NORMAL
OHS CV CPX PEAK SYSTOLIC BLOOD PRESSURE: 135 MMHG
OHS CV CPX PERCENT MAX PREDICTED HEART RATE ACHIEVED: 73
OHS CV CPX RATE PRESSURE PRODUCT PRESENTING: 8932
RETIRED EF AND QEF - SEE NOTES: 53 %
SYSTOLIC BLOOD PRESSURE: 116 MMHG

## 2023-09-12 PROCEDURE — A9502 TC99M TETROFOSMIN: HCPCS

## 2023-09-12 PROCEDURE — 93016 NUCLEAR STRESS - CARDIOLOGY INTERPRETED (CUPID ONLY): ICD-10-PCS | Mod: COE,,, | Performed by: INTERNAL MEDICINE

## 2023-09-12 PROCEDURE — 93018 CV STRESS TEST I&R ONLY: CPT | Mod: COE,,, | Performed by: INTERNAL MEDICINE

## 2023-09-12 PROCEDURE — 78452 HT MUSCLE IMAGE SPECT MULT: CPT

## 2023-09-12 PROCEDURE — 63600175 PHARM REV CODE 636 W HCPCS: Performed by: INTERNAL MEDICINE

## 2023-09-12 PROCEDURE — 78452 NUCLEAR STRESS - CARDIOLOGY INTERPRETED (CUPID ONLY): ICD-10-PCS | Mod: 26,COE,, | Performed by: INTERNAL MEDICINE

## 2023-09-12 PROCEDURE — 93016 CV STRESS TEST SUPVJ ONLY: CPT | Mod: COE,,, | Performed by: INTERNAL MEDICINE

## 2023-09-12 PROCEDURE — 93018 NUCLEAR STRESS - CARDIOLOGY INTERPRETED (CUPID ONLY): ICD-10-PCS | Mod: COE,,, | Performed by: INTERNAL MEDICINE

## 2023-09-12 PROCEDURE — 78452 HT MUSCLE IMAGE SPECT MULT: CPT | Mod: 26,COE,, | Performed by: INTERNAL MEDICINE

## 2023-09-12 RX ORDER — ACETAMINOPHEN 500 MG
1000 TABLET ORAL ONCE
Status: CANCELLED | OUTPATIENT
Start: 2023-09-12 | End: 2023-09-12

## 2023-09-12 RX ORDER — CELECOXIB 200 MG/1
400 CAPSULE ORAL ONCE
Status: CANCELLED | OUTPATIENT
Start: 2023-09-12 | End: 2023-09-12

## 2023-09-12 RX ORDER — AMINOPHYLLINE 25 MG/ML
75 INJECTION, SOLUTION INTRAVENOUS ONCE
Status: DISCONTINUED | OUTPATIENT
Start: 2023-09-12 | End: 2023-09-12 | Stop reason: HOSPADM

## 2023-09-12 RX ORDER — REGADENOSON 0.08 MG/ML
0.4 INJECTION, SOLUTION INTRAVENOUS
Status: DISCONTINUED | OUTPATIENT
Start: 2023-09-12 | End: 2023-09-12 | Stop reason: HOSPADM

## 2023-09-12 RX ADMIN — AMINOPHYLLINE 75 MG: 25 INJECTION, SOLUTION INTRAVENOUS at 09:09

## 2023-09-12 RX ADMIN — REGADENOSON 0.4 MG: 0.08 INJECTION, SOLUTION INTRAVENOUS at 09:09

## 2023-09-12 NOTE — ASSESSMENT & PLAN NOTE
Left CCA occlusion.  Reconstituted flow at the left ICA with abnormal waveform.  Collateral flow potentially contributed by the contralateral right ICA noting velocity elevation with otherwise normal range ICA/CCA ratio.  Further correlation with dedicated CTA imaging can be obtained as warranted.     Majority antegrade flow of the left vertebral artery with partial reversed diastolic flow.

## 2023-09-12 NOTE — TELEPHONE ENCOUNTER
Called pt to discuss sx arrival time. Pt wanted to wait until they heard from stress test  Advised me to call back later.

## 2023-09-12 NOTE — TELEPHONE ENCOUNTER
I called the patient today regarding surgery on 9/13/2023 with Dr. Iam Harris. I informed the patient that her surgery will be at  [facility] ([facility] [place] [address]). I informed the patient they must arrive at 8:00am and their surgery will start at 10:00am.     I reminded the patient that they cannot eat or drink after midnight, the night before surgery.     I reminded the patient to be careful of their skin over the next few days to make sure they do not get any cuts, scratches or scrapes.    The patient verbalized understanding and has no further questions.

## 2023-09-13 ENCOUNTER — ANESTHESIA (OUTPATIENT)
Dept: SURGERY | Facility: HOSPITAL | Age: 68
DRG: 470 | End: 2023-09-13
Payer: COMMERCIAL

## 2023-09-13 ENCOUNTER — HOSPITAL ENCOUNTER (INPATIENT)
Facility: HOSPITAL | Age: 68
LOS: 1 days | Discharge: HOME | DRG: 470 | End: 2023-09-14
Attending: ORTHOPAEDIC SURGERY | Admitting: ORTHOPAEDIC SURGERY
Payer: COMMERCIAL

## 2023-09-13 DIAGNOSIS — M17.12 PRIMARY OSTEOARTHRITIS OF LEFT KNEE: Primary | ICD-10-CM

## 2023-09-13 PROCEDURE — 94799 UNLISTED PULMONARY SVC/PX: CPT

## 2023-09-13 PROCEDURE — 25000003 PHARM REV CODE 250: Performed by: ORTHOPAEDIC SURGERY

## 2023-09-13 PROCEDURE — D9220A PRA ANESTHESIA: Mod: ANES,COE,, | Performed by: SURGERY

## 2023-09-13 PROCEDURE — C1776 JOINT DEVICE (IMPLANTABLE): HCPCS | Performed by: ORTHOPAEDIC SURGERY

## 2023-09-13 PROCEDURE — C1713 ANCHOR/SCREW BN/BN,TIS/BN: HCPCS | Performed by: ORTHOPAEDIC SURGERY

## 2023-09-13 PROCEDURE — 25000003 PHARM REV CODE 250: Performed by: NURSE PRACTITIONER

## 2023-09-13 PROCEDURE — 63600175 PHARM REV CODE 636 W HCPCS: Performed by: ORTHOPAEDIC SURGERY

## 2023-09-13 PROCEDURE — 11000001 HC ACUTE MED/SURG PRIVATE ROOM

## 2023-09-13 PROCEDURE — 25000003 PHARM REV CODE 250: Performed by: SURGERY

## 2023-09-13 PROCEDURE — 63600175 PHARM REV CODE 636 W HCPCS: Performed by: NURSE PRACTITIONER

## 2023-09-13 PROCEDURE — 99900035 HC TECH TIME PER 15 MIN (STAT)

## 2023-09-13 PROCEDURE — 37000009 HC ANESTHESIA EA ADD 15 MINS: Performed by: ORTHOPAEDIC SURGERY

## 2023-09-13 PROCEDURE — 25000003 PHARM REV CODE 250: Performed by: PHYSICIAN ASSISTANT

## 2023-09-13 PROCEDURE — 36000710: Performed by: ORTHOPAEDIC SURGERY

## 2023-09-13 PROCEDURE — 71000033 HC RECOVERY, INTIAL HOUR: Performed by: ORTHOPAEDIC SURGERY

## 2023-09-13 PROCEDURE — 63600175 PHARM REV CODE 636 W HCPCS: Performed by: STUDENT IN AN ORGANIZED HEALTH CARE EDUCATION/TRAINING PROGRAM

## 2023-09-13 PROCEDURE — 27447 PR TOTAL KNEE ARTHROPLASTY: ICD-10-PCS | Mod: LT,COE,, | Performed by: ORTHOPAEDIC SURGERY

## 2023-09-13 PROCEDURE — D9220A PRA ANESTHESIA: Mod: CRNA,COE,, | Performed by: NURSE ANESTHETIST, CERTIFIED REGISTERED

## 2023-09-13 PROCEDURE — 97165 OT EVAL LOW COMPLEX 30 MIN: CPT

## 2023-09-13 PROCEDURE — 97116 GAIT TRAINING THERAPY: CPT

## 2023-09-13 PROCEDURE — 63600175 PHARM REV CODE 636 W HCPCS: Performed by: NURSE ANESTHETIST, CERTIFIED REGISTERED

## 2023-09-13 PROCEDURE — 63600175 PHARM REV CODE 636 W HCPCS: Performed by: SURGERY

## 2023-09-13 PROCEDURE — D9220A PRA ANESTHESIA: ICD-10-PCS | Mod: ANES,COE,, | Performed by: SURGERY

## 2023-09-13 PROCEDURE — 27100025 HC TUBING, SET FLUID WARMER: Performed by: SURGERY

## 2023-09-13 PROCEDURE — 27447 TOTAL KNEE ARTHROPLASTY: CPT | Mod: LT,COE,, | Performed by: ORTHOPAEDIC SURGERY

## 2023-09-13 PROCEDURE — 37000008 HC ANESTHESIA 1ST 15 MINUTES: Performed by: ORTHOPAEDIC SURGERY

## 2023-09-13 PROCEDURE — 94761 N-INVAS EAR/PLS OXIMETRY MLT: CPT

## 2023-09-13 PROCEDURE — 36620 INSERTION CATHETER ARTERY: CPT | Mod: 59,COE,, | Performed by: SURGERY

## 2023-09-13 PROCEDURE — 25000003 PHARM REV CODE 250

## 2023-09-13 PROCEDURE — D9220A PRA ANESTHESIA: ICD-10-PCS | Mod: CRNA,COE,, | Performed by: NURSE ANESTHETIST, CERTIFIED REGISTERED

## 2023-09-13 PROCEDURE — 97161 PT EVAL LOW COMPLEX 20 MIN: CPT

## 2023-09-13 PROCEDURE — 25000003 PHARM REV CODE 250: Performed by: NURSE ANESTHETIST, CERTIFIED REGISTERED

## 2023-09-13 PROCEDURE — 64448 NJX AA&/STRD FEM NRV NFS IMG: CPT | Mod: 59,LT,COE, | Performed by: SURGERY

## 2023-09-13 PROCEDURE — 27201423 OPTIME MED/SURG SUP & DEVICES STERILE SUPPLY: Performed by: ORTHOPAEDIC SURGERY

## 2023-09-13 PROCEDURE — 64448 LEFT ADDUCTOR CANAL CATHETER: ICD-10-PCS | Mod: 59,LT,COE, | Performed by: SURGERY

## 2023-09-13 PROCEDURE — 36620 ARTERIAL: ICD-10-PCS | Mod: 59,COE,, | Performed by: SURGERY

## 2023-09-13 PROCEDURE — 71000039 HC RECOVERY, EACH ADD'L HOUR: Performed by: ORTHOPAEDIC SURGERY

## 2023-09-13 PROCEDURE — 97535 SELF CARE MNGMENT TRAINING: CPT

## 2023-09-13 PROCEDURE — 36000711: Performed by: ORTHOPAEDIC SURGERY

## 2023-09-13 DEVICE — IMPLANTABLE DEVICE: Type: IMPLANTABLE DEVICE | Site: KNEE | Status: FUNCTIONAL

## 2023-09-13 DEVICE — CEMENT BONE W/GENT SIMPLEX HV: Type: IMPLANTABLE DEVICE | Site: KNEE | Status: FUNCTIONAL

## 2023-09-13 DEVICE — PLUG TAPER: Type: IMPLANTABLE DEVICE | Site: KNEE | Status: FUNCTIONAL

## 2023-09-13 DEVICE — STEM TIBIAL NEXGEN PRECOAT: Type: IMPLANTABLE DEVICE | Site: KNEE | Status: FUNCTIONAL

## 2023-09-13 RX ORDER — NALOXONE HCL 0.4 MG/ML
0.02 VIAL (ML) INJECTION
Status: DISCONTINUED | OUTPATIENT
Start: 2023-09-13 | End: 2023-09-14 | Stop reason: HOSPADM

## 2023-09-13 RX ORDER — ROPIVACAINE HYDROCHLORIDE 5 MG/ML
INJECTION, SOLUTION EPIDURAL; INFILTRATION; PERINEURAL
Status: COMPLETED | OUTPATIENT
Start: 2023-09-13 | End: 2023-09-13

## 2023-09-13 RX ORDER — LIDOCAINE HYDROCHLORIDE 20 MG/ML
INJECTION INTRAVENOUS
Status: DISCONTINUED | OUTPATIENT
Start: 2023-09-13 | End: 2023-09-13

## 2023-09-13 RX ORDER — ACETAMINOPHEN 500 MG
1000 TABLET ORAL
Status: COMPLETED | OUTPATIENT
Start: 2023-09-13 | End: 2023-09-13

## 2023-09-13 RX ORDER — ROPIVACAINE HYDROCHLORIDE 2 MG/ML
INJECTION, SOLUTION EPIDURAL; INFILTRATION; PERINEURAL CONTINUOUS
Status: DISCONTINUED | OUTPATIENT
Start: 2023-09-13 | End: 2023-09-14 | Stop reason: HOSPADM

## 2023-09-13 RX ORDER — MUPIROCIN 20 MG/G
1 OINTMENT TOPICAL 2 TIMES DAILY
Status: DISCONTINUED | OUTPATIENT
Start: 2023-09-13 | End: 2023-09-14 | Stop reason: HOSPADM

## 2023-09-13 RX ORDER — LIDOCAINE HYDROCHLORIDE 10 MG/ML
1 INJECTION, SOLUTION EPIDURAL; INFILTRATION; INTRACAUDAL; PERINEURAL
Status: DISCONTINUED | OUTPATIENT
Start: 2023-09-13 | End: 2023-09-13 | Stop reason: HOSPADM

## 2023-09-13 RX ORDER — TALC
6 POWDER (GRAM) TOPICAL NIGHTLY PRN
Status: DISCONTINUED | OUTPATIENT
Start: 2023-09-13 | End: 2023-09-14 | Stop reason: HOSPADM

## 2023-09-13 RX ORDER — PROPOFOL 10 MG/ML
VIAL (ML) INTRAVENOUS
Status: DISCONTINUED | OUTPATIENT
Start: 2023-09-13 | End: 2023-09-13

## 2023-09-13 RX ORDER — MIDAZOLAM HYDROCHLORIDE 1 MG/ML
.5-4 INJECTION INTRAMUSCULAR; INTRAVENOUS
Status: DISCONTINUED | OUTPATIENT
Start: 2023-09-13 | End: 2023-09-13 | Stop reason: HOSPADM

## 2023-09-13 RX ORDER — DEXAMETHASONE SODIUM PHOSPHATE 4 MG/ML
INJECTION, SOLUTION INTRA-ARTICULAR; INTRALESIONAL; INTRAMUSCULAR; INTRAVENOUS; SOFT TISSUE
Status: DISCONTINUED | OUTPATIENT
Start: 2023-09-13 | End: 2023-09-13

## 2023-09-13 RX ORDER — ASPIRIN 81 MG/1
81 TABLET ORAL 2 TIMES DAILY
Status: DISCONTINUED | OUTPATIENT
Start: 2023-09-13 | End: 2023-09-14 | Stop reason: HOSPADM

## 2023-09-13 RX ORDER — ACETAMINOPHEN 500 MG
1000 TABLET ORAL EVERY 6 HOURS
Status: DISCONTINUED | OUTPATIENT
Start: 2023-09-13 | End: 2023-09-14 | Stop reason: HOSPADM

## 2023-09-13 RX ORDER — ONDANSETRON 2 MG/ML
4 INJECTION INTRAMUSCULAR; INTRAVENOUS EVERY 8 HOURS PRN
Status: DISCONTINUED | OUTPATIENT
Start: 2023-09-13 | End: 2023-09-14 | Stop reason: HOSPADM

## 2023-09-13 RX ORDER — PROCHLORPERAZINE EDISYLATE 5 MG/ML
5 INJECTION INTRAMUSCULAR; INTRAVENOUS EVERY 6 HOURS PRN
Status: DISCONTINUED | OUTPATIENT
Start: 2023-09-13 | End: 2023-09-14 | Stop reason: HOSPADM

## 2023-09-13 RX ORDER — MIDAZOLAM HYDROCHLORIDE 1 MG/ML
4 INJECTION INTRAMUSCULAR; INTRAVENOUS
Status: DISCONTINUED | OUTPATIENT
Start: 2023-09-13 | End: 2023-09-13

## 2023-09-13 RX ORDER — FENTANYL CITRATE 50 UG/ML
100 INJECTION, SOLUTION INTRAMUSCULAR; INTRAVENOUS
Status: DISCONTINUED | OUTPATIENT
Start: 2023-09-13 | End: 2023-09-13

## 2023-09-13 RX ORDER — SODIUM CHLORIDE 0.9 % (FLUSH) 0.9 %
10 SYRINGE (ML) INJECTION
Status: DISCONTINUED | OUTPATIENT
Start: 2023-09-13 | End: 2023-09-13 | Stop reason: HOSPADM

## 2023-09-13 RX ORDER — FAMOTIDINE 10 MG/ML
INJECTION INTRAVENOUS
Status: DISCONTINUED | OUTPATIENT
Start: 2023-09-13 | End: 2023-09-13

## 2023-09-13 RX ORDER — HYDROMORPHONE HYDROCHLORIDE 1 MG/ML
0.2 INJECTION, SOLUTION INTRAMUSCULAR; INTRAVENOUS; SUBCUTANEOUS EVERY 5 MIN PRN
Status: DISCONTINUED | OUTPATIENT
Start: 2023-09-13 | End: 2023-09-13 | Stop reason: HOSPADM

## 2023-09-13 RX ORDER — ATORVASTATIN CALCIUM 20 MG/1
20 TABLET, FILM COATED ORAL DAILY
Status: DISCONTINUED | OUTPATIENT
Start: 2023-09-13 | End: 2023-09-14 | Stop reason: HOSPADM

## 2023-09-13 RX ORDER — MUPIROCIN 20 MG/G
1 OINTMENT TOPICAL
Status: COMPLETED | OUTPATIENT
Start: 2023-09-13 | End: 2023-09-13

## 2023-09-13 RX ORDER — PROPOFOL 10 MG/ML
VIAL (ML) INTRAVENOUS CONTINUOUS PRN
Status: DISCONTINUED | OUTPATIENT
Start: 2023-09-13 | End: 2023-09-13

## 2023-09-13 RX ORDER — PREGABALIN 75 MG/1
75 CAPSULE ORAL
Status: COMPLETED | OUTPATIENT
Start: 2023-09-13 | End: 2023-09-13

## 2023-09-13 RX ORDER — OXYCODONE HYDROCHLORIDE 10 MG/1
10 TABLET ORAL
Status: DISCONTINUED | OUTPATIENT
Start: 2023-09-13 | End: 2023-09-14 | Stop reason: HOSPADM

## 2023-09-13 RX ORDER — SODIUM CHLORIDE 9 MG/ML
INJECTION, SOLUTION INTRAVENOUS
Status: COMPLETED | OUTPATIENT
Start: 2023-09-13 | End: 2023-09-13

## 2023-09-13 RX ORDER — PHENYLEPHRINE HYDROCHLORIDE 10 MG/ML
INJECTION INTRAVENOUS
Status: DISCONTINUED | OUTPATIENT
Start: 2023-09-13 | End: 2023-09-13

## 2023-09-13 RX ORDER — CELECOXIB 200 MG/1
400 CAPSULE ORAL ONCE
Status: COMPLETED | OUTPATIENT
Start: 2023-09-13 | End: 2023-09-13

## 2023-09-13 RX ORDER — FENTANYL CITRATE 50 UG/ML
25-200 INJECTION, SOLUTION INTRAMUSCULAR; INTRAVENOUS
Status: DISCONTINUED | OUTPATIENT
Start: 2023-09-13 | End: 2023-09-13 | Stop reason: HOSPADM

## 2023-09-13 RX ORDER — METHOCARBAMOL 750 MG/1
750 TABLET, FILM COATED ORAL 3 TIMES DAILY
Status: DISCONTINUED | OUTPATIENT
Start: 2023-09-13 | End: 2023-09-14 | Stop reason: HOSPADM

## 2023-09-13 RX ORDER — SODIUM CHLORIDE 9 MG/ML
INJECTION, SOLUTION INTRAVENOUS CONTINUOUS
Status: DISCONTINUED | OUTPATIENT
Start: 2023-09-13 | End: 2023-09-14 | Stop reason: HOSPADM

## 2023-09-13 RX ORDER — KETAMINE HCL IN 0.9 % NACL 50 MG/5 ML
SYRINGE (ML) INTRAVENOUS
Status: DISCONTINUED | OUTPATIENT
Start: 2023-09-13 | End: 2023-09-13

## 2023-09-13 RX ORDER — ONDANSETRON 2 MG/ML
INJECTION INTRAMUSCULAR; INTRAVENOUS
Status: DISCONTINUED | OUTPATIENT
Start: 2023-09-13 | End: 2023-09-13

## 2023-09-13 RX ORDER — OXYCODONE HYDROCHLORIDE 5 MG/1
5 TABLET ORAL
Status: DISCONTINUED | OUTPATIENT
Start: 2023-09-13 | End: 2023-09-14 | Stop reason: HOSPADM

## 2023-09-13 RX ORDER — AMOXICILLIN 250 MG
1 CAPSULE ORAL 2 TIMES DAILY
Status: DISCONTINUED | OUTPATIENT
Start: 2023-09-13 | End: 2023-09-14 | Stop reason: HOSPADM

## 2023-09-13 RX ORDER — PREGABALIN 75 MG/1
75 CAPSULE ORAL NIGHTLY
Status: DISCONTINUED | OUTPATIENT
Start: 2023-09-13 | End: 2023-09-14 | Stop reason: HOSPADM

## 2023-09-13 RX ORDER — HALOPERIDOL 5 MG/ML
0.5 INJECTION INTRAMUSCULAR EVERY 10 MIN PRN
Status: DISCONTINUED | OUTPATIENT
Start: 2023-09-13 | End: 2023-09-13 | Stop reason: HOSPADM

## 2023-09-13 RX ORDER — ROPIVACAINE/EPI/CLONIDINE/KET 2.46-0.005
SYRINGE (ML) INJECTION
Status: DISCONTINUED | OUTPATIENT
Start: 2023-09-13 | End: 2023-09-13 | Stop reason: HOSPADM

## 2023-09-13 RX ORDER — FENTANYL CITRATE 50 UG/ML
25 INJECTION, SOLUTION INTRAMUSCULAR; INTRAVENOUS EVERY 5 MIN PRN
Status: DISCONTINUED | OUTPATIENT
Start: 2023-09-13 | End: 2023-09-13 | Stop reason: HOSPADM

## 2023-09-13 RX ORDER — FAMOTIDINE 20 MG/1
20 TABLET, FILM COATED ORAL 2 TIMES DAILY
Status: DISCONTINUED | OUTPATIENT
Start: 2023-09-13 | End: 2023-09-14 | Stop reason: HOSPADM

## 2023-09-13 RX ORDER — CARBOXYMETHYLCELLULOSE SODIUM 10 MG/ML
GEL OPHTHALMIC
Status: DISCONTINUED | OUTPATIENT
Start: 2023-09-13 | End: 2023-09-13

## 2023-09-13 RX ORDER — BISACODYL 10 MG
10 SUPPOSITORY, RECTAL RECTAL EVERY 12 HOURS PRN
Status: DISCONTINUED | OUTPATIENT
Start: 2023-09-13 | End: 2023-09-14 | Stop reason: HOSPADM

## 2023-09-13 RX ORDER — ASCORBIC ACID 250 MG
1000 TABLET ORAL DAILY
Status: DISCONTINUED | OUTPATIENT
Start: 2023-09-13 | End: 2023-09-14 | Stop reason: HOSPADM

## 2023-09-13 RX ORDER — POLYETHYLENE GLYCOL 3350 17 G/17G
17 POWDER, FOR SOLUTION ORAL DAILY
Status: DISCONTINUED | OUTPATIENT
Start: 2023-09-13 | End: 2023-09-14 | Stop reason: HOSPADM

## 2023-09-13 RX ORDER — MORPHINE SULFATE 2 MG/ML
2 INJECTION, SOLUTION INTRAMUSCULAR; INTRAVENOUS
Status: DISCONTINUED | OUTPATIENT
Start: 2023-09-13 | End: 2023-09-14 | Stop reason: HOSPADM

## 2023-09-13 RX ORDER — VANCOMYCIN HYDROCHLORIDE 1 G/20ML
INJECTION, POWDER, LYOPHILIZED, FOR SOLUTION INTRAVENOUS
Status: DISCONTINUED | OUTPATIENT
Start: 2023-09-13 | End: 2023-09-13 | Stop reason: HOSPADM

## 2023-09-13 RX ADMIN — VANCOMYCIN HYDROCHLORIDE 1000 MG: 1 INJECTION, POWDER, LYOPHILIZED, FOR SOLUTION INTRAVENOUS at 08:09

## 2023-09-13 RX ADMIN — SODIUM CHLORIDE: 0.9 INJECTION, SOLUTION INTRAVENOUS at 08:09

## 2023-09-13 RX ADMIN — PHENYLEPHRINE HYDROCHLORIDE 0.5 MCG/KG/MIN: 10 INJECTION INTRAVENOUS at 01:09

## 2023-09-13 RX ADMIN — MEPIVACAINE HYDROCHLORIDE 3.4 ML: 20 INJECTION, SOLUTION EPIDURAL; INFILTRATION at 10:09

## 2023-09-13 RX ADMIN — MUPIROCIN 1 G: 20 OINTMENT TOPICAL at 08:09

## 2023-09-13 RX ADMIN — SODIUM CHLORIDE 1000 ML: 9 INJECTION, SOLUTION INTRAVENOUS at 06:09

## 2023-09-13 RX ADMIN — Medication 10 MG: at 10:09

## 2023-09-13 RX ADMIN — SENNOSIDES AND DOCUSATE SODIUM 1 TABLET: 50; 8.6 TABLET ORAL at 09:09

## 2023-09-13 RX ADMIN — PREGABALIN 75 MG: 75 CAPSULE ORAL at 08:09

## 2023-09-13 RX ADMIN — SODIUM CHLORIDE, SODIUM GLUCONATE, SODIUM ACETATE, POTASSIUM CHLORIDE, MAGNESIUM CHLORIDE, SODIUM PHOSPHATE, DIBASIC, AND POTASSIUM PHOSPHATE: .53; .5; .37; .037; .03; .012; .00082 INJECTION, SOLUTION INTRAVENOUS at 10:09

## 2023-09-13 RX ADMIN — ROPIVACAINE HYDROCHLORIDE 10 ML: 5 INJECTION EPIDURAL; INFILTRATION; PERINEURAL at 09:09

## 2023-09-13 RX ADMIN — MUPIROCIN 1 G: 20 OINTMENT TOPICAL at 09:09

## 2023-09-13 RX ADMIN — PROPOFOL 30 MG: 10 INJECTION, EMULSION INTRAVENOUS at 10:09

## 2023-09-13 RX ADMIN — PHENYLEPHRINE HYDROCHLORIDE 100 MCG: 10 INJECTION INTRAVENOUS at 10:09

## 2023-09-13 RX ADMIN — FAMOTIDINE 20 MG: 10 INJECTION, SOLUTION INTRAVENOUS at 10:09

## 2023-09-13 RX ADMIN — CEFAZOLIN 2 G: 2 INJECTION, POWDER, FOR SOLUTION INTRAMUSCULAR; INTRAVENOUS at 06:09

## 2023-09-13 RX ADMIN — PHENYLEPHRINE HYDROCHLORIDE 100 MCG: 10 INJECTION INTRAVENOUS at 12:09

## 2023-09-13 RX ADMIN — CARBOXYMETHYLCELLULOSE SODIUM 4 DROP: 10 GEL OPHTHALMIC at 10:09

## 2023-09-13 RX ADMIN — METHOCARBAMOL 750 MG: 750 TABLET ORAL at 08:09

## 2023-09-13 RX ADMIN — ONDANSETRON 4 MG: 2 INJECTION INTRAMUSCULAR; INTRAVENOUS at 12:09

## 2023-09-13 RX ADMIN — PHENYLEPHRINE HYDROCHLORIDE 0.25 MCG/KG/MIN: 10 INJECTION INTRAVENOUS at 10:09

## 2023-09-13 RX ADMIN — ACETAMINOPHEN 1000 MG: 500 TABLET ORAL at 04:09

## 2023-09-13 RX ADMIN — CELECOXIB 400 MG: 200 CAPSULE ORAL at 08:09

## 2023-09-13 RX ADMIN — SODIUM CHLORIDE, SODIUM GLUCONATE, SODIUM ACETATE, POTASSIUM CHLORIDE, MAGNESIUM CHLORIDE, SODIUM PHOSPHATE, DIBASIC, AND POTASSIUM PHOSPHATE: .53; .5; .37; .037; .03; .012; .00082 INJECTION, SOLUTION INTRAVENOUS at 12:09

## 2023-09-13 RX ADMIN — Medication 1000 MG: at 04:09

## 2023-09-13 RX ADMIN — METHOCARBAMOL 750 MG: 750 TABLET ORAL at 04:09

## 2023-09-13 RX ADMIN — OXYCODONE HYDROCHLORIDE 5 MG: 5 TABLET ORAL at 01:09

## 2023-09-13 RX ADMIN — DEXAMETHASONE SODIUM PHOSPHATE 8 MG: 4 INJECTION, SOLUTION INTRAMUSCULAR; INTRAVENOUS at 10:09

## 2023-09-13 RX ADMIN — LIDOCAINE HYDROCHLORIDE 50 MG: 20 INJECTION INTRAVENOUS at 10:09

## 2023-09-13 RX ADMIN — CEFAZOLIN 2 G: 2 INJECTION, POWDER, FOR SOLUTION INTRAMUSCULAR; INTRAVENOUS at 10:09

## 2023-09-13 RX ADMIN — ASPIRIN 81 MG: 81 TABLET, COATED ORAL at 08:09

## 2023-09-13 RX ADMIN — ACETAMINOPHEN 1000 MG: 500 TABLET ORAL at 08:09

## 2023-09-13 RX ADMIN — FAMOTIDINE 20 MG: 20 TABLET, FILM COATED ORAL at 08:09

## 2023-09-13 RX ADMIN — MIDAZOLAM 1 MG: 1 INJECTION INTRAMUSCULAR; INTRAVENOUS at 09:09

## 2023-09-13 RX ADMIN — Medication: at 01:09

## 2023-09-13 RX ADMIN — TRANEXAMIC ACID 1000 MG: 100 INJECTION INTRAVENOUS at 10:09

## 2023-09-13 RX ADMIN — TRANEXAMIC ACID 1000 MG: 100 INJECTION INTRAVENOUS at 12:09

## 2023-09-13 RX ADMIN — FENTANYL CITRATE 100 MCG: 50 INJECTION, SOLUTION INTRAMUSCULAR; INTRAVENOUS at 09:09

## 2023-09-13 RX ADMIN — PROPOFOL 100 MCG/KG/MIN: 10 INJECTION, EMULSION INTRAVENOUS at 10:09

## 2023-09-13 RX ADMIN — ATORVASTATIN CALCIUM 20 MG: 20 TABLET, FILM COATED ORAL at 04:09

## 2023-09-13 RX ADMIN — SODIUM CHLORIDE: 9 INJECTION, SOLUTION INTRAVENOUS at 04:09

## 2023-09-13 RX ADMIN — POLYETHYLENE GLYCOL 3350 17 G: 17 POWDER, FOR SOLUTION ORAL at 04:09

## 2023-09-13 NOTE — PT/OT/SLP EVAL
Occupational Therapy Evaluation and Treatment    Name: Watson Castro  MRN: [id number]  Admitting Diagnosis: Primary osteoarthritis of left knee Day of Surgery  Recent Surgery: Procedure(s) (LRB):  ARTHROPLASTY, KNEE, TOTAL - left - STEWART (Left) Day of Surgery    Recommendations:     Discharge Recommendations: home  Level of Assistance Recommended: 24 hours supervision  Discharge Equipment Recommendations: bath bench  Barriers to discharge: None    Assessment:     Watson Castro is a 68 y.o. female with a medical diagnosis of Primary osteoarthritis of left knee. She presents with performance deficits affecting function including impaired self care skills, impaired functional mobility, orthopedic precautions. Pt was able to perform supine/sit, sit/stand, bed/chair, and was able to walk to bathroom c CGA and RW.  Able to perform toilet hygiene and grooming c CGA and was able to perform UB dressing c min A.  Pt is progressing well.    Rehab Prognosis: Good; patient would benefit from acute OT services to address these deficits and reach maximum level of function.    Plan:     Patient to be seen daily to address the above listed problems via self-care/home management, therapeutic activities, therapeutic exercises  Plan of Care Expires: 09/14/23  Plan of Care Reviewed with: patient, daughter    Subjective     Chief Complaint: L TKA  Patient Comments/Goals: I need to go to the bathroom.  Pain/Comfort:  Pain Rating 1: 5/10    Patients cultural, spiritual, Jehovah's witness conflicts given the current situation: no    Social History:  Living Environment: Patient lives with their spouse in a single story home with number of outside stair(s): 1 and tub-shower combo  Prior Level of Function: Prior to admission, patient was independent.  Roles and Routines: Patient was driving prior to admission.  Equipment Used at Home: walker, rolling, bedside commode, grab bar  DME owned (not currently used): rolling walker and bedside  commode  Assistance Upon Discharge: significant other    Objective:     Communicated with RN prior to session. Patient found supine with cryotherapy, perineural catheter upon OT entry to room.    General Precautions: Standard, fall   Orthopedic Precautions: LLE weight bearing as tolerated   Braces: N/A    Respiratory Status: Room air    Occupational Performance    Gait belt applied - Yes    Bed Mobility:   Supine to sit from right side of bed with supervision    Functional Mobility/Transfers:  Sit <> Stand Transfer with contact guard assistance with rolling walker  Bed <> Chair Transfer using Stand Pivot technique with contact guard assistance with rolling walker  Toilet Transfer Stand Pivot technique with contact guard assistance with rolling walker and bedside commode  Functional Mobility: Pt was able to walk to bathroom c CGA and RW.    Activities of Daily Living:  Grooming: contact guard assistance to wash hands while standing at sink c RW.  Upper Body Dressing: minimum assistance to don hospital gown.  Toileting: contact guard assistance for toilet hygiene.      Physical Exam:  Upper Extremity Range of Motion:     -       Right Upper Extremity: WFL  -       Left Upper Extremity: WFL  Upper Extremity Strength:    -       Right Upper Extremity: WFL  -       Left Upper Extremity: WFL    AMPAC 6 Click ADL:  AMPAC Total Score: 24    Treatment & Education:  Educated on the importance of mobility to maximize recovery  Educated on the importance of OOB mobility within safe range in order to decrease adverse effects of prolonged bedrest  Educated on safety with functional mobility; hand placement to ensure safe transfers to various surfaces in prep for ADLs  Educated on performing functional mobility and ADLs in adherence to orthopedic precautions  Educated on weight bearing status  Will continue to educate as needed      Patient clear to ambulate to/from bathroom with RN/PCT, assist x1 .    Patient left up in chair  with all lines intact, call button in reach, and RN notified.    GOALS:   Multidisciplinary Problems       Occupational Therapy Goals          Problem: Occupational Therapy    Goal Priority Disciplines Outcome Interventions   Occupational Therapy Goal     OT, PT/OT Ongoing, Progressing    Description: Goals to be met by: 9/14/23     Patient will increase functional independence with ADLs by performing:    UE Dressing with Modified [place].  LE Dressing with Modified [place] and Assistive Devices as needed.  Grooming while standing at sink with Modified [place].  Toileting from bedside commode with Modified [place] for hygiene and clothing management.   Bathing from  shower chair/bench with Modified [place].  Toilet transfer to bedside commode with Modified [place].                         History:     Past Medical History:   Diagnosis Date    Iron deficiency anemia          Past Surgical History:   Procedure Laterality Date    CATARACT EXTRACTION W/  INTRAOCULAR LENS IMPLANT Right 10/11/2022    Procedure: EXTRACTION, CATARACT, WITH IOL INSERTION;  Surgeon: Shai Anaya MD;  Location: [facility];  Service: Ophthalmology;  Laterality: Right;    CATARACT EXTRACTION W/  INTRAOCULAR LENS IMPLANT Left 10/25/2022    Procedure: EXTRACTION, CATARACT, WITH IOL INSERTION;  Surgeon: Shai Anaya MD;  Location: [facility];  Service: Ophthalmology;  Laterality: Left;       Time Tracking:     OT Date of Treatment: 09/13/23  OT Start Time: 1537  OT Stop Time: 1558  OT Total Time (min): 21 min    Billable Minutes: Evaluation 11 and Self Care/Home Management 10    9/13/2023

## 2023-09-13 NOTE — PT/OT/SLP EVAL
Physical Therapy Evaluation and Treatment    Patient Name: Watson Castro   MRN: [id number]  Recent Surgery: Procedure(s) (LRB):  ARTHROPLASTY, KNEE, TOTAL - left - [facility]ART (Left) Day of Surgery    Recommendations:     Discharge Recommendations: outpatient PT   Discharge Equipment Recommendations: bath bench   Barriers to discharge: None    Assessment:     Watson Castro is a 68 y.o. female admitted with a medical diagnosis of Primary osteoarthritis of left knee. She presents with the following impairments/functional limitations: weakness, impaired functional mobility, gait instability, impaired balance, decreased lower extremity function, pain, decreased ROM, orthopedic precautions. Patient tolerated PT session well. Patient ambulated 60ft with RW and contact guard assistance. No LOB or SOB noted. Patient has an OPPT appointment on 9/15/2023.     Rehab Prognosis: Good; patient would benefit from acute PT services to address these deficits and reach maximum level of function.    Plan:     During this hospitalization, patient to be seen daily to address the above listed problems via gait training, therapeutic activities, therapeutic exercises    Plan of Care Expires: 09/15/23    Subjective     Chief Complaint: Left knee pain.   Patient Comments/Goals: To walk without pain.   Pain/Comfort:  Pain Rating 1: 5/10  Location - Side 1: Left  Location 1: knee  Pain Addressed 1: Pre-medicate for activity, Reposition, Distraction    Social History:  Living Environment: Patient with her  in a 2SH with 1 step to enter. Master bed and bath on 1st floor.   Prior Level of Function: Prior to admission, patient was independent  Equipment at Home: bedside commode, walker, rolling  Assistance Upon Discharge: family    Objective:     Communicated with RN prior to session. Patient found up in chair with cryotherapy, perineural catheter, FCD, peripheral IV (Nimbus) upon PT entry to room. Daughter present in room.     General  Precautions: Standard, fall   Orthopedic Precautions: LLE weight bearing as tolerated   Braces: N/A    Respiratory Status: Room air    Exams:  RLE ROM: WFL  RLE Strength: WFL  LLE ROM: WFL except limited at knee due to pain  LLE Strength:  appears WFL but did not formally assess due to pain and recent surgery  Cognitive: Patient is oriented to Person, Place, Time, Situation    Functional Mobility:  Gait belt applied - Yes  Bed Mobility  Seated in chair at start of session and returned to chair  Transfers  Sit to Stand: contact guard assistance with rolling walker x1 from bedside chair   Gait  Patient ambulated 60ft with rolling walker and contact guard assistance. Patient required cues for sequencing and weight bearing status to increase independence and safety.    Therapeutic Activities and Exercises:  Patient educated in:  -PT role and POC  -safety with transfers including hand placement  -gait sequencing and RW management  -OOB activity to maximize recovery including ambulating with nursing staff assistance and RW while in the hospital     AM-PAC 6 CLICK MOBILITY  Total Score:17    Patient left up in chair with all lines intact, call button in reach, RN notified, and daughter present.    GOALS:   Multidisciplinary Problems       Physical Therapy Goals          Problem: Physical Therapy    Goal Priority Disciplines Outcome Goal Variances Interventions   Physical Therapy Goal     PT, PT/OT Ongoing, Progressing     Description: Goals to be met by: 9/15/2023    Patient will increase functional independence with mobility by performin. Supine to sit with supervision  2. Sit to stand transfer with Supervision  3. Gait x200 feet with Supervision using Rolling Walker  4. Ascend/Descend 1 curb step with Stand by assistance using Rolling Walker  5. Lower extremity exercise program x30 reps per handout, with supervision                            History:     Past Medical History:   Diagnosis Date    Iron deficiency  anemia        Past Surgical History:   Procedure Laterality Date    CATARACT EXTRACTION W/  INTRAOCULAR LENS IMPLANT Right 10/11/2022    Procedure: EXTRACTION, CATARACT, WITH IOL INSERTION;  Surgeon: Shai Anaya MD;  Location: [facility];  Service: Ophthalmology;  Laterality: Right;    CATARACT EXTRACTION W/  INTRAOCULAR LENS IMPLANT Left 10/25/2022    Procedure: EXTRACTION, CATARACT, WITH IOL INSERTION;  Surgeon: Shai Anaya MD;  Location: [facility];  Service: Ophthalmology;  Laterality: Left;       Time Tracking:     PT Received On: 09/13/23  PT Start Time: 1618  PT Stop Time: 1645  PT Total Time (min): 27 min     Billable Minutes: Evaluation 10 and Gait Training 17    9/13/2023

## 2023-09-13 NOTE — ANESTHESIA PROCEDURE NOTES
Arterial    Diagnosis: OA    Patient location during procedure: done in OR  Procedure start time: 9/13/2023 10:25 AM  Timeout: 9/13/2023 10:24 AM  Procedure end time: 9/13/2023 10:28 AM    Staffing  Authorizing Provider: Iam Mata MD  Performing Provider: Iam Mata MD    Staffing  Performed by: Iam Mata MD  Authorized by: Iam Mata MD    Anesthesiologist was present at the time of the procedure.  Arterial  Skin Prep: chlorhexidine gluconate  Local Infiltration: none and lidocaine  Orientation: left  Location: radial    Catheter Size: 20 G  Catheter placement by Ultrasound guidance. Heme positive aspiration all ports.   Vessel Caliber: medium, patent, compressibility normal  Needle advanced into vessel with real time Ultrasound guidance.  Guidewire confirmed in vessel.  Sterile sheath used.  Image recorded and saved.Insertion Attempts: 3  Assessment  Dressing: secured with tape and tegaderm  Patient: Tolerated well  Additional Notes  Pressure not correlating with the cuff.  2 attempts made on the right with access to the artery obtained but unable to thread a wire to place

## 2023-09-13 NOTE — ANESTHESIA PREPROCEDURE EVALUATION
Pre-operative evaluation for Procedure(s) (LRB):  ARTHROPLASTY, KNEE, TOTAL - left - STEWART (Left)    Watson Castro is a 68 y.o. female     Patient Active Problem List   Diagnosis    Iron deficiency anemia    Mixed hyperlipidemia    Constipation    NSAID long-term use    Snoring    Abnormal EKG    Cardiac murmur    Bruit of right carotid artery    Prediabetes    Primary osteoarthritis of left knee    Occlusion of left carotid artery       Review of patient's allergies indicates:   Allergen Reactions    Sesame seed Anaphylaxis     Rash    Sulfa (sulfonamide antibiotics) Rash       No current facility-administered medications on file prior to encounter.     Current Outpatient Medications on File Prior to Encounter   Medication Sig Dispense Refill    ascorbic acid, vitamin C, (VITAMIN C) 1000 MG tablet Take 1,000 mg by mouth once daily.      Lactobac no.41/Bifidobact no.7 (PROBIOTIC-10 ORAL) Take 1 tablet by mouth once daily.      mecobalamin-levomefolate calc (EB-P1 DR) 0.4 mg- 15 mg CpDR Take 1 capsule by mouth once daily. 90 capsule 4    naproxen (NAPROSYN) 500 MG tablet Take 1 tablet (500 mg total) by mouth daily as needed (For Knee pain). 90 tablet 4    psyllium seed, with sugar, (FIBER ORAL) Take by mouth.      zinc gluconate 50 mg tablet Take 50 mg by mouth once daily.      diclofenac sodium (VOLTAREN) 1 % Gel APPLY 2 GRAMS TOPICALLY 3 TIMES DAILY 100 g 0       Past Surgical History:   Procedure Laterality Date    CATARACT EXTRACTION W/  INTRAOCULAR LENS IMPLANT Right 10/11/2022    Procedure: EXTRACTION, CATARACT, WITH IOL INSERTION;  Surgeon: Shai Anaya MD;  Location: [facility], [place], operated by [facility] OR;  Service: Ophthalmology;  Laterality: Right;    CATARACT EXTRACTION W/  INTRAOCULAR LENS IMPLANT Left 10/25/2022    Procedure: EXTRACTION, CATARACT, WITH IOL INSERTION;  Surgeon: Shai Anaya MD;  Location: [facility], [place], operated by [facility] OR;  Service: Ophthalmology;  Laterality: Left;         CBC:  Lab Results    Component Value Date    WBC 7.25 08/31/2023    RBC 4.81 08/31/2023    HGB 12.7 08/31/2023    HCT 39.7 08/31/2023     08/31/2023    MCV 83 08/31/2023    MCH 26.4 (L) 08/31/2023    MCHC 32.0 08/31/2023       CMP:   Lab Results   Component Value Date     08/31/2023    K 4.4 08/31/2023     08/31/2023    CO2 25 08/31/2023    BUN 15 08/31/2023    CREATININE 0.8 08/31/2023    GLU 93 08/31/2023    CALCIUM 9.9 08/31/2023    ALBUMIN 3.9 08/31/2023    PROT 7.8 08/31/2023    ALKPHOS 113 08/31/2023    ALT 15 08/31/2023    AST 17 08/31/2023    BILITOT 0.5 08/31/2023       INR:  Lab Results   Component Value Date    INR 1.0 08/31/2023         Diagnostic Studies:      EKG:   Results for orders placed or performed during the hospital encounter of 08/31/23   EKG 12-lead    Collection Time: 08/31/23  9:54 AM    Narrative    Test Reason : Z01.818,    Vent. Rate : 111 BPM     Atrial Rate : 111 BPM     P-R Int : 128 ms          QRS Dur : 074 ms      QT Int : 346 ms       P-R-T Axes : 069 007 151 degrees     QTc Int : 470 ms    Sinus tachycardia  LVH with repolarization abnormality ( R in aVL , Sokolow-Grider , [place]  product , Romh[employer]ing )  Abnormal ECG  No previous ECGs available  Confirmed by Kristi Olivares MD ([id number]) on 8/31/2023 3:11:32 PM    Referred By: KRISTI MICHEL           Confirmed By:Kristi Olivares MD        2D Echo:  No results found for this or any previous visit.    Stress Test:   Results for orders placed during the hospital encounter of 09/12/23    Nuclear Stress - Cardiology Interpreted    Interpretation Summary    Normal myocardial perfusion scan. There is no evidence of myocardial ischemia or infarction.    The LVEF is not accurate due to poor software boundary tracking at rest  and poor software boundary tracking during stress. The visually estimated ejection fraction is normal at rest and normal during stress.    There is normal wall motion at rest and post stress.    LV cavity size is  normal at rest and normal at stress.    The ECG portion of the study is abnormal but not diagnostic due to resting ST-T abnormalities.    The patient reported no chest pain during the stress test.    There were no arrhythmias during stress.    There are no prior studies for comparison.      Pre-op Vitals [09/13/23 0821]   BP Pulse Resp Temp SpO2   (!) 171/81 98 12 37 °C (98.6 °F) 100 %      Height Weight BMI (Calculated)     5' 116 lb 22.7         Pre-op Vitals [09/13/23 0821]   BP Pulse Resp Temp SpO2   (!) 171/81 98 12 37 °C (98.6 °F) 100 %      Height Weight BMI (Calculated)     5' 116 lb 22.7            Pre-op Assessment    I have reviewed the Patient Summary Reports.     I have reviewed the Nursing Notes. I have reviewed the NPO Status.   I have reviewed the Medications.     Review of Systems  Anesthesia Hx:  No problems with previous Anesthesia  Denies Family Hx of Anesthesia complications.   Denies Personal Hx of Anesthesia complications.   Hematology/Oncology:     Oncology Normal    -- Anemia:   EENT/Dental:EENT/Dental Normal   Cardiovascular:   hyperlipidemia Left carotid occlusion    Pulmonary:  Pulmonary Normal    Renal/:  Renal/ Normal     Hepatic/GI:  Hepatic/GI Normal    Musculoskeletal:   Arthritis     Neurological:  Neurology Normal    Endocrine:  Endocrine Normal    Dermatological:  Skin Normal    Psych:  Psychiatric Normal           Physical Exam  General: Well nourished and Alert    Airway:  Mallampati: II   Mouth Opening: Normal  Tongue: Normal    Dental:  Intact        Anesthesia Plan  Type of Anesthesia, risks & benefits discussed:    Anesthesia Type: CSE, Spinal, Gen ETT  Intra-op Monitoring Plan: Standard ASA Monitors and Art Line  Post Op Pain Control Plan: multimodal analgesia, IV/PO Opioids PRN and peripheral nerve block  Induction:  IV  Airway Plan: Video  Informed Consent: Informed consent signed with the Patient and all parties understand the risks and agree with anesthesia  plan.  All questions answered.   ASA Score: 3  Anesthesia Plan Notes: Discussed with patient concern for extent of carotid disease increasing the risk of possible CVA.  WIll plan for phenylephrine infusion to keep blood pressure normotensive.  Previous systolic blood pressures ranging 110s to 170s mmHg.      Ready For Surgery From Anesthesia Perspective.     .

## 2023-09-13 NOTE — TRANSFER OF CARE
Anesthesia Transfer of Care Note    Patient: Watson Castro    Procedure(s) Performed: Procedure(s) (LRB):  ARTHROPLASTY, KNEE, TOTAL - left - STEWART (Left)    Patient location: PACU    Anesthesia Type: spinal    Transport from OR: Transported from OR on 6-10 L/min O2 by face mask with adequate spontaneous ventilation    Post pain: adequate analgesia    Post assessment: no apparent anesthetic complications and tolerated procedure well    Post vital signs: stable    Level of consciousness: sedated    Nausea/Vomiting: no nausea/vomiting    Complications: none    Transfer of care protocol was followed      Last vitals:   Visit Vitals  BP (!) 128/58   Pulse 70   Temp 37 °C (98.6 °F) (Oral)   Resp 16   Ht 5' (1.524 m)   Wt 52.6 kg (116 lb)   SpO2 100%   Breastfeeding No   BMI 22.65 kg/m²

## 2023-09-13 NOTE — NURSING
Pt arrived to unit via hospital bed from PACU.  Pt aaox4, vss, no s/s of distress noted.  Dressing to left knee cdi.  Accutherm in place.  Nimbus Pump intact.  Pt instructed to call for assistance when getting up and she verbalized understanding.  Call light placed within reach.  Daughter at bedside.

## 2023-09-13 NOTE — ANESTHESIA POSTPROCEDURE EVALUATION
Anesthesia Post Evaluation    Patient: Watson Castro    Procedure(s) Performed: Procedure(s) (LRB):  ARTHROPLASTY, KNEE, TOTAL - left - STEWART (Left)    Final Anesthesia Type: spinal      Patient location during evaluation: PACU  Patient participation: Yes- Able to Participate  Level of consciousness: awake and alert and oriented  Post-procedure vital signs: reviewed and stable  Pain management: adequate  Airway patency: patent  JORDI mitigation strategies: Multimodal analgesia  PONV status at discharge: No PONV  Anesthetic complications: no      Cardiovascular status: blood pressure returned to baseline and hemodynamically stable  Respiratory status: unassisted, spontaneous ventilation and room air  Hydration status: euvolemic  Follow-up not needed.          Vitals Value Taken Time   /69 09/13/23 1610   Temp 36.6 °C (97.9 °F) 09/13/23 1610   Pulse 94 09/13/23 1610   Resp 18 09/13/23 1610   SpO2 95 % 09/13/23 1610         Event Time   Out of Recovery 15:20:00         Pain/Marlene Score: Pain Rating Prior to Med Admin: 5 (9/13/2023  4:13 PM)  Marlene Score: 10 (9/13/2023  2:15 PM)

## 2023-09-13 NOTE — ASSESSMENT & PLAN NOTE
Nbaasfanniesadia RORO Matthew is a 68 y.o. female s/p left TKA 9/13/23.       Pain control: multimodal, pain pump in place  PT/OT: WBAT LLE  DVT PPx: asa 81 bid x 4 weeks, SCDs at all times when not ambulating  Abx: postop Ancef      Dispo: dc home with outpatient PT

## 2023-09-13 NOTE — H&P
CC:  Left knee pain     Watson Castro is a 68 y.o. female with history of Left knee pain. Pain is worse with activity and weight bearing.  Patient has experienced interference of activities of daily living due to decreased range of motion and an increase in joint pain and swelling.  Patient has failed non-operative treatment including NSAIDs, corticosteroid injections, viscosupplement injections, and activity modification.  Watson Castro currently ambulates independently.      Relevant medical conditions of significance in perioperative period:  Iron deficiency anemia- on vitamins managed by pcp      Given documented medical comorbidities including those listed above and based off of CMS criteria patient would meet inpatient admission status guidelines. This case has been approved as inpatient.           Past Medical History:   Diagnosis Date    Iron deficiency anemia                 Past Surgical History:   Procedure Laterality Date    CATARACT EXTRACTION W/  INTRAOCULAR LENS IMPLANT Right 10/11/2022     Procedure: EXTRACTION, CATARACT, WITH IOL INSERTION;  Surgeon: Shai Anaya MD;  Location: [facility];  Service: Ophthalmology;  Laterality: Right;    CATARACT EXTRACTION W/  INTRAOCULAR LENS IMPLANT Left 10/25/2022     Procedure: EXTRACTION, CATARACT, WITH IOL INSERTION;  Surgeon: Shai Anaya MD;  Location: [facility];  Service: Ophthalmology;  Laterality: Left;               Family History   Problem Relation Age of Onset    Amblyopia Neg Hx      Blindness Neg Hx      Cataracts Neg Hx      Glaucoma Neg Hx      Macular degeneration Neg Hx      Retinal detachment Neg Hx      Strabismus Neg Hx                 Review of patient's allergies indicates:   Allergen Reactions    Sesame seed Anaphylaxis       Rash    Sulfa (sulfonamide antibiotics) Rash            Current Outpatient Medications:     ascorbic acid, vitamin C, (VITAMIN C) 1000 MG tablet, Take 1,000 mg by mouth once daily., Disp: , Rfl:      diclofenac sodium (VOLTAREN) 1 % Gel, APPLY 2 GRAMS TOPICALLY 3 TIMES DAILY, Disp: 100 g, Rfl: 0    Lactobac no.41/Bifidobact no.7 (PROBIOTIC-10 ORAL), Take 1 tablet by mouth once daily., Disp: , Rfl:     mecobalamin-levomefolate calc (EB-P1 DR) 0.4 mg- 15 mg CpDR, Take 1 capsule by mouth once daily., Disp: 90 capsule, Rfl: 4    naproxen (NAPROSYN) 500 MG tablet, Take 1 tablet (500 mg total) by mouth daily as needed (For Knee pain)., Disp: 90 tablet, Rfl: 4    psyllium seed, with sugar, (FIBER ORAL), Take by mouth., Disp: , Rfl:     zinc gluconate 50 mg tablet, Take 50 mg by mouth once daily., Disp: , Rfl:      Review of Systems:  Constitutional: no fever or chills  Eyes: no visual changes  ENT: no nasal congestion or sore throat  Respiratory: no cough or shortness of breath  Cardiovascular: no chest pain or palpitations  Gastrointestinal: no nausea or vomiting, tolerating diet  Genitourinary: no hematuria or dysuria  Integument/Breast: no rash or pruritis  Hematologic/Lymphatic: no easy bruising or lymphadenopathy  Musculoskeletal: positive for knee pain  Neurological: no seizures or tremors  Behavioral/Psych: no auditory or visual hallucinations  Endocrine: no heat or cold intolerance     PE:  There were no vitals taken for this visit.  General: Pleasant, cooperative, NAD   Gait: antalgic  HEENT: NCAT, sclera nonicteric   Lungs: Respirations clear bilaterally; equal and unlabored.   CV: S1S2; 2+ bilateral upper and lower extremity pulses.   Skin: Intact throughout with no rashes, erythema, or lesions  Extremities: No LE edema,  no erythema or warmth of the skin in either lower extremity.     Left knee exam:  Knee Range of Motion:5-110, pain with passive range of motion  Effusion:none  Condition of skin:intact  Location of tenderness:Medial joint line   Strength:normal  Stability: stable to testing     Hip Examination: painless PROM of hip      Radiographs: Radiographs reveal advanced degenerative changes  including subchondral cyst formation, subchondral sclerosis, osteophyte formation, joint space narrowing.      Knee Alignment: varus     Diagnosis: Primary osteoarthritis Left knee     Plan: Left total knee arthroplasty.   I explained to the patient that stiff knees make stiff knee replacements, and that they should not expect to achieve more flexion postoperatively than they have preoperatively.       Due to the serious nature of total joint infection and the high prevalence of community acquired MRSA, vancomycin will be used perioperatively.

## 2023-09-13 NOTE — PLAN OF CARE
Problem: Pain Acute  Goal: Acceptable Pain Control and Functional Ability  Intervention: Develop Pain Management Plan  Flowsheets (Taken 9/13/2023 1740)  Pain Management Interventions:   care clustered   cold applied   pain management plan reviewed with patient/caregiver     Problem: Pain Acute  Goal: Acceptable Pain Control and Functional Ability  Intervention: Prevent or Manage Pain  Flowsheets (Taken 9/13/2023 1740)  Sensory Stimulation Regulation:   quiet environment promoted   lighting decreased  Medication Review/Management: medications reviewed     Problem: Pain Acute  Goal: Acceptable Pain Control and Functional Ability  Intervention: Optimize Psychosocial Wellbeing  Flowsheets (Taken 9/13/2023 1740)  Supportive Measures:   active listening utilized   positive reinforcement provided     Problem: Adjustment to Surgery (Knee Arthroplasty)  Goal: Optimal Coping  Intervention: Support Psychosocial Response to Surgery and Mobility Changes  Flowsheets (Taken 9/13/2023 1740)  Supportive Measures:   active listening utilized   positive reinforcement provided     Problem: Bleeding (Knee Arthroplasty)  Goal: Absence of Bleeding  Intervention: Monitor and Manage Bleeding  Flowsheets (Taken 9/13/2023 1740)  Bleeding Management: dressing monitored     Problem: Infection (Knee Arthroplasty)  Goal: Absence of Infection Signs and Symptoms  Intervention: Prevent or Manage Infection  Flowsheets (Taken 9/13/2023 1740)  Infection Management: aseptic technique maintained     Problem: Pain (Knee Arthroplasty)  Goal: Acceptable Pain Control  Intervention: Prevent or Manage Pain  Flowsheets (Taken 9/13/2023 1740)  Pain Management Interventions:   care clustered   cold applied   pain management plan reviewed with patient/caregiver    Plan of care reviewed with pt, verbalized understanding. Medications and possible side effects reviewed and administered as ordered.  Purposeful rounding completed.  Safety precautions maintained.   Call light placed within reach.

## 2023-09-13 NOTE — SUBJECTIVE & OBJECTIVE
Principal Problem:Primary osteoarthritis of left knee    Principal Orthopedic Problem: s/p L TKA    Interval History: NAEON, VSS, AF. Pain well controlled. Incision CDI. Voiding spontaneously. Nimbus in place. Walked 60 ft with PT yesterday.     Plan for discharge today with outpatient PT pending PT recs today.     Review of patient's allergies indicates:   Allergen Reactions    Sesame seed Anaphylaxis     Rash    Sulfa (sulfonamide antibiotics) Rash       Current Facility-Administered Medications   Medication    0.9%  NaCl infusion    acetaminophen tablet 1,000 mg    ascorbic acid (vitamin C) tablet 1,000 mg    atorvastatin tablet 20 mg    ceFAZolin 2 g in dextrose 5 % in water (D5W) 50 mL IVPB (MB+)    fentaNYL 50 mcg/mL injection 25 mcg    fentaNYL 50 mcg/mL injection  mcg    LIDOcaine (PF) 10 mg/ml (1%) injection 10 mg    melatonin tablet 6 mg    methocarbamoL tablet 750 mg    midazolam (VERSED) 1 mg/mL injection 0.5-4 mg    morphine injection 2 mg    naloxone 0.4 mg/mL injection 0.02 mg    ondansetron injection 4 mg    oxyCODONE immediate release tablet 10 mg    oxyCODONE immediate release tablet 5 mg    pregabalin capsule 75 mg    prochlorperazine injection Soln 5 mg    ropivacaine 0.2% [facility] PainPRO Pump infusion 500 ML    sodium chloride 0.9% flush 10 mL    tranexamic acid (CYKLOKAPRON) 1,000 mg in sodium chloride 0.9 % 100 mL IVPB (MB+)     Facility-Administered Medications Ordered in Other Encounters   Medication    carboxymethylcellulose sodium 1 % DpGe    dexAMETHasone injection    famotidine (PF) injection    isolyte infusion    ketamine in 0.9 % sod chloride 50 mg/5 mL (10 mg/mL) injection    LIDOcaine (cardiac) injection    ondansetron injection    PHENYLephrine 10 mg in sodium chloride 0.9% 125 mL infusion    PHENYLephrine injection    propofol (DIPRIVAN) 10 mg/mL infusion    propofol (DIPRIVAN) 10 mg/mL infusion    sodium chloride 0.9% infusion     Objective:     Vital Signs (Most  Recent):  Temp: 98.6 °F (37 °C) (09/13/23 0821)  Pulse: 91 (09/13/23 0950)  Resp: 18 (09/13/23 0950)  BP: (!) 147/57 (09/13/23 0950)  SpO2: 100 % (09/13/23 0950) Vital Signs (24h Range):  Temp:  [98.6 °F (37 °C)] 98.6 °F (37 °C)  Pulse:  [79-98] 91  Resp:  [10-20] 18  SpO2:  [95 %-100 %] 100 %  BP: (116-180)/(56-81) 147/57     Weight: 52.6 kg (116 lb)  Height: 5' (152.4 cm)  Body mass index is 22.65 kg/m².      Intake/Output Summary (Last 24 hours) at 9/13/2023 1303  Last data filed at 9/13/2023 1258  Gross per 24 hour   Intake 2600 ml   Output --   Net 2600 ml        Ortho/SPM Exam    General appearance - no acute distress, conversant  Musculoskeletal -  Dressing C/D/I  Fires quad/TA/EHL/GSC  SILT SP/DP/TN  WWP distally  Calves soft, nontender bilateral       Significant Labs: All pertinent labs within the past 24 hours have been reviewed.    Significant Imaging: I have reviewed and interpreted all pertinent imaging results/findings.

## 2023-09-13 NOTE — ANESTHESIA PROCEDURE NOTES
Spinal    Diagnosis: OA  Patient location during procedure: OR  Start time: 9/13/2023 10:09 AM  Timeout: 9/13/2023 10:09 AM  End time: 9/13/2023 10:15 AM    Staffing  Authorizing Provider: Iam Mata MD  Performing Provider: Iam Mata MD    Staffing  Performed by: Iam Mata MD  Authorized by: Iam Mata MD    Preanesthetic Checklist  Completed: patient identified, IV checked, site marked, risks and benefits discussed, surgical consent, monitors and equipment checked, pre-op evaluation and timeout performed  Spinal Block  Patient position: sitting  Prep: ChloraPrep  Patient monitoring: heart rate, continuous pulse ox and frequent blood pressure checks  Approach: left paramedian  Location: L3-4  Injection technique: single shot  CSF Fluid: clear free-flowing CSF  Needle  Needle type: Jose   Needle gauge: 25 G  Needle length: 3.5 in  Additional Documentation: incremental injection, negative aspiration for heme and no paresthesia on injection  Needle localization: anatomical landmarks  Assessment  Ease of block: moderate  Patient's tolerance of the procedure: comfortable throughout block  Medications:    Medications: mepivacaine (CARBOCAINE) injection 20 mg/mL (2%) - Intrathecal   3.4 mL - 9/13/2023 10:15:00 AM

## 2023-09-13 NOTE — ANESTHESIA PROCEDURE NOTES
LEft Adductor Canal Catheter    Patient location during procedure: OR   Block not for primary anesthetic.  Reason for block: at surgeon's request and post-op pain management   Post-op Pain Location: LEft knee   Start time: 9/13/2023 9:19 AM  Timeout: 9/13/2023 9:18 AM   End time: 9/13/2023 9:28 AM    Staffing  Authorizing Provider: Iam Mata MD  Performing Provider: Iam Mata MD    Staffing  Performed by: Iam Mata MD  Authorized by: Iam Mata MD    Preanesthetic Checklist  Completed: patient identified, IV checked, site marked, risks and benefits discussed, surgical consent, monitors and equipment checked, pre-op evaluation and timeout performed  Peripheral Block  Patient position: supine  Prep: ChloraPrep and site prepped and draped  Patient monitoring: heart rate, cardiac monitor, continuous pulse ox, continuous capnometry and frequent blood pressure checks  Block type: adductor canal  Laterality: left  Injection technique: continuous  Needle  Needle type: Tuohy   Needle gauge: 17 G  Needle length: 3.5 in  Needle localization: anatomical landmarks and ultrasound guidance  Catheter type: spring wound  Catheter size: 19 G  Test dose: lidocaine 1.5% with Epi 1-to-200,000 and negative   -ultrasound image captured on disc.  Assessment  Injection assessment: negative aspiration, negative parasthesia and local visualized surrounding nerve  Paresthesia pain: none  Heart rate change: no  Slow fractionated injection: yes  Pain Tolerance: comfortable throughout block and no complaints  Medications:    Medications: ropivacaine (NAROPIN) injection 0.5% - Perineural   10 mL - 9/13/2023 9:23:00 AM    Additional Notes  VSS.  [facility] RN monitoring vitals throughout procedure.  Patient tolerated procedure well.

## 2023-09-13 NOTE — PLAN OF CARE
Problem: Occupational Therapy  Goal: Occupational Therapy Goal  Description: Goals to be met by: 9/14/23     Patient will increase functional independence with ADLs by performing:    UE Dressing with Modified [place].  LE Dressing with Modified [place] and Assistive Devices as needed.  Grooming while standing at sink with Modified [place].  Toileting from bedside commode with Modified [place] for hygiene and clothing management.   Bathing from  shower chair/bench with Modified [place].  Toilet transfer to bedside commode with Modified [place].    Outcome: Ongoing, Progressing

## 2023-09-13 NOTE — PLAN OF CARE
Pre op complete. Questions answered. Resting comfortably. Call light in reach. Belongings with family, walker and commode at home.

## 2023-09-13 NOTE — PLAN OF CARE
Patient tolerated PT session well. Patient ambulated 60ft with RW and contact guard assistance. No LOB or SOB noted. Patient has an OPPT appointment on 9/15/2023.     Problem: Physical Therapy  Goal: Physical Therapy Goal  Description: Goals to be met by: 9/15/2023    Patient will increase functional independence with mobility by performin. Supine to sit with supervision  2. Sit to stand transfer with Supervision  3. Gait x200 feet with Supervision using Rolling Walker  4. Ascend/Descend 1 curb step with Stand by assistance using Rolling Walker  5. Lower extremity exercise program x30 reps per handout, with supervision       Outcome: Ongoing, Progressing

## 2023-09-13 NOTE — OP NOTE
Kimberly Left TKA  OPERATIVE NOTE    Date of Operation:   9/13/2023    Providers Performing:   Surgeon(s):  Iam Harris MD  Assistant: Wolfgang Daley MD    Operative Procedure:   Left Total Knee Arthroplasty, CPT 56094    Preoperative Diagnosis:   Left Knee Osteoarthritis, ICD-10 M17.12    Postoperative Diagnosis:   SAME    Components Used:     Implant Name Type Inv. Item Serial No.  Lot No. LRB No. Used Action   CEMENT BONE W/GENT SIMPLEX HV - [id number]  CEMENT BONE W/GENT SIMPLEX HV [id number] [employer].  Left 2 Implanted   PLUG TAPER - [id number]  PLUG TAPER  [employer] [id number] Left 1 Implanted   TIBIAL INSERT POST SZ CD 1-2 1 - [id number]  TIBIAL INSERT POST SZ CD 1-2 1  [employer] [id number] Left 1 Implanted   FEMORAL POST STBLZD FEMALE C L - [id number]  FEMORAL POST STBLZD FEMALE C L  [employer] [id number] Left 1 Implanted   STEM TIBIAL NEXGEN PRECOAT - [id number]  STEM TIBIAL NEXGEN PRECOAT  [employer] [id number] Left 1 Implanted   C/2/10 mm Prolong poly    Anesthesia:   Spinal  ACC  DAWSON cocktail: NARENDRA    Estimated Blood Loss:   100 cc    Specimens:   None    Complications:   None    Indications:   The patient has failed non-operative measures including medications, injections and activity modifications for their left knee.  After an explanation of risks and benefits of knee arthroplasty surgery, the patient wishes to proceed with surgery.    Operative Notes:   After the induction of satisfactory anesthesia, the patient's left lower extremity was prepped and draped in the usual sterile fashion with the tourniquet cuff in place. The extremity was exsanguinated with an Esmarch bandage, and the tourniquet inflated to 250 mmHg. An anterior midline incision was made, and the dissection was carried sharply through the subcutaneous tissues and prepatellar bursa layer which was reflected medially. A modified medial parapatellar arthrotomy was performed, and the patella was subluxated  laterally. There was a large amount of clear joint fluid. Most of the fat pad was excised, and the medial release was completed around to the mid-coronal point. A drill hole was made in the distal femur, and the canal was suctioned. The sword was placed in 5° of valgus. The distal femoral cut was made. The femur was sized, and the secondary femoral cuts were made in 3° external rotation. The knee was maximally flexed, and a drill hole was made in the footprint of the ACL. The canal was suctioned. An intramedullary alignment rakesh was placed with good purchase in the isthmus. The upper tibial cut was made perpendicular in both planes.  The flexion gap was checked after removing cruciate and meniscal remnants, and was found to be satisfactory with a spacer block once appropriate releases at the joint level were performed. Flexion and extension gaps were symmetric. The femoral finishing guide was used, and trials were inserted. We used a tibial baseplate template which gave us good coverage. The rotational position of it was marked with the Bovie after ranging the knee.  We had full extension without hyperextension, full flexion, good stability in both. The patella was measured with a caliper and not resurfaced as it was only 21 mm thick, the bone quality was poor, morphology was normal, and it tracked well in the anterior femoral flange.  Additionally, it had about 50% normal articular cartilage.  Her diminutive size and poor bone quality would have placed her at high risk of postop patella remnant fracture.  The trials were removed, and the tibia was drilled and punched in line with the previous rotational harris. The bone was cleaned with pulsatile lavage and dried thoroughly. The components were impacted in the usual fashion with Simplex HV plus gentamicin cement. Patellar tracking was central without lateral release. A lateral patellaplasty was performed. The knee was irrigated with Betadine and 3 liters of pulsatile  lavage.  Local anesthetic was infiltrated around the joint.  Vancomycin powder 1 g was placed in the knee joint. The arthrotomy was repaired with running and interrupted figure-of-eight sutures of #1 Vicryl. The prepatellar bursa tissue was closed with interrupted 0 Vicryl. The skin was closed with interrupted, inverted 2-0 Vicryl, running 3-0 Monocryl, and Dermabond.  An Aquacel and Borrero dressing was applied, and the patient was taken to the PACU in stable condition without apparent orthopedic or anesthetic complications.    Sponge and needle counts were correct.    The first-assistant was critical to all steps of the operation, including retraction and leg stabilization during exposure and bone preparation, as well as the deep and superficial wound closure.  Dr. Harris performed and/or supervised the key portions of this surgical procedure, including evaluation of the bone cuts, reshaping of the bony elements, and insertion of the provisional and final components.    Postoperative Plan:  The patient will be anticoagulated with 81mg aspirin twice daily for one month.   They will receive 24 hours of post-operative antibiotics.    Activity will be weight bearing as tolerated.    Signed by: Iam Harris MD

## 2023-09-14 VITALS
OXYGEN SATURATION: 98 % | RESPIRATION RATE: 18 BRPM | SYSTOLIC BLOOD PRESSURE: 119 MMHG | HEIGHT: 60 IN | WEIGHT: 116 LBS | BODY MASS INDEX: 22.78 KG/M2 | HEART RATE: 103 BPM | DIASTOLIC BLOOD PRESSURE: 69 MMHG | TEMPERATURE: 99 F

## 2023-09-14 PROCEDURE — 97530 THERAPEUTIC ACTIVITIES: CPT

## 2023-09-14 PROCEDURE — 25000003 PHARM REV CODE 250: Performed by: NURSE PRACTITIONER

## 2023-09-14 PROCEDURE — 99900035 HC TECH TIME PER 15 MIN (STAT)

## 2023-09-14 PROCEDURE — 25000003 PHARM REV CODE 250

## 2023-09-14 PROCEDURE — 25000003 PHARM REV CODE 250: Performed by: ORTHOPAEDIC SURGERY

## 2023-09-14 PROCEDURE — 25000003 PHARM REV CODE 250: Performed by: ANESTHESIOLOGY

## 2023-09-14 PROCEDURE — 63600175 PHARM REV CODE 636 W HCPCS: Performed by: ORTHOPAEDIC SURGERY

## 2023-09-14 PROCEDURE — 97116 GAIT TRAINING THERAPY: CPT

## 2023-09-14 PROCEDURE — 97110 THERAPEUTIC EXERCISES: CPT

## 2023-09-14 PROCEDURE — 99232 PR SUBSEQUENT HOSPITAL CARE,LEVL II: ICD-10-PCS | Mod: FS,COE,, | Performed by: ANESTHESIOLOGY

## 2023-09-14 PROCEDURE — 97535 SELF CARE MNGMENT TRAINING: CPT

## 2023-09-14 PROCEDURE — 99232 SBSQ HOSP IP/OBS MODERATE 35: CPT | Mod: FS,COE,, | Performed by: ANESTHESIOLOGY

## 2023-09-14 PROCEDURE — 63600175 PHARM REV CODE 636 W HCPCS: Performed by: ANESTHESIOLOGY

## 2023-09-14 PROCEDURE — 94761 N-INVAS EAR/PLS OXIMETRY MLT: CPT

## 2023-09-14 RX ORDER — CELECOXIB 200 MG/1
200 CAPSULE ORAL DAILY
Status: DISCONTINUED | OUTPATIENT
Start: 2023-09-14 | End: 2023-09-14 | Stop reason: HOSPADM

## 2023-09-14 RX ORDER — ASPIRIN 81 MG/1
81 TABLET ORAL 2 TIMES DAILY
Qty: 60 TABLET | Refills: 0 | Status: SHIPPED | OUTPATIENT
Start: 2023-09-14 | End: 2023-10-14

## 2023-09-14 RX ORDER — OXYCODONE HYDROCHLORIDE 5 MG/1
5 TABLET ORAL EVERY 6 HOURS PRN
Qty: 50 TABLET | Refills: 0 | Status: SHIPPED | OUTPATIENT
Start: 2023-09-14 | End: 2023-09-26 | Stop reason: SDUPTHER

## 2023-09-14 RX ORDER — DOCUSATE SODIUM 100 MG/1
100 CAPSULE, LIQUID FILLED ORAL 2 TIMES DAILY
Qty: 60 CAPSULE | Refills: 0 | Status: SHIPPED | OUTPATIENT
Start: 2023-09-14 | End: 2023-10-09 | Stop reason: SDUPTHER

## 2023-09-14 RX ORDER — DEXTROMETHORPHAN HYDROBROMIDE, GUAIFENESIN 5; 100 MG/5ML; MG/5ML
650 LIQUID ORAL EVERY 8 HOURS
Qty: 120 TABLET | Refills: 0 | Status: SHIPPED | OUTPATIENT
Start: 2023-09-14 | End: 2023-10-25 | Stop reason: SDUPTHER

## 2023-09-14 RX ORDER — CELECOXIB 200 MG/1
200 CAPSULE ORAL DAILY
Qty: 30 CAPSULE | Refills: 0 | Status: SHIPPED | OUTPATIENT
Start: 2023-09-14 | End: 2023-10-09 | Stop reason: SDUPTHER

## 2023-09-14 RX ADMIN — ACETAMINOPHEN 1000 MG: 500 TABLET ORAL at 03:09

## 2023-09-14 RX ADMIN — POLYETHYLENE GLYCOL 3350 17 G: 17 POWDER, FOR SOLUTION ORAL at 09:09

## 2023-09-14 RX ADMIN — ACETAMINOPHEN 1000 MG: 500 TABLET ORAL at 12:09

## 2023-09-14 RX ADMIN — SODIUM CHLORIDE: 9 INJECTION, SOLUTION INTRAVENOUS at 12:09

## 2023-09-14 RX ADMIN — FAMOTIDINE 20 MG: 20 TABLET, FILM COATED ORAL at 09:09

## 2023-09-14 RX ADMIN — Medication 1000 MG: at 09:09

## 2023-09-14 RX ADMIN — CEFAZOLIN 2 G: 2 INJECTION, POWDER, FOR SOLUTION INTRAMUSCULAR; INTRAVENOUS at 02:09

## 2023-09-14 RX ADMIN — MELATONIN TAB 3 MG 6 MG: 3 TAB at 12:09

## 2023-09-14 RX ADMIN — SENNOSIDES AND DOCUSATE SODIUM 1 TABLET: 50; 8.6 TABLET ORAL at 09:09

## 2023-09-14 RX ADMIN — METHOCARBAMOL 750 MG: 750 TABLET ORAL at 09:09

## 2023-09-14 RX ADMIN — ASPIRIN 81 MG: 81 TABLET, COATED ORAL at 09:09

## 2023-09-14 RX ADMIN — MUPIROCIN 1 G: 20 OINTMENT TOPICAL at 09:09

## 2023-09-14 RX ADMIN — CALCIUM GLUCONATE 1 G: 98 INJECTION, SOLUTION INTRAVENOUS at 10:09

## 2023-09-14 RX ADMIN — ACETAMINOPHEN 1000 MG: 500 TABLET ORAL at 05:09

## 2023-09-14 RX ADMIN — ATORVASTATIN CALCIUM 20 MG: 20 TABLET, FILM COATED ORAL at 09:09

## 2023-09-14 RX ADMIN — SODIUM CHLORIDE 500 ML: 9 INJECTION, SOLUTION INTRAVENOUS at 09:09

## 2023-09-14 RX ADMIN — SODIUM CHLORIDE 500 ML: 9 INJECTION, SOLUTION INTRAVENOUS at 01:09

## 2023-09-14 RX ADMIN — CELECOXIB 200 MG: 200 CAPSULE ORAL at 09:09

## 2023-09-14 NOTE — NURSING
Notified DEYVI Gonzales of pt's manual b/p = 74/50.  Pt asymptomatic.  Order given for 1 liter bolus of NS.  Call light placed within reach.  Instructed pt not to get up without calling nurse.  She verbalized understanding.

## 2023-09-14 NOTE — PLAN OF CARE
Problem: Pain Acute  Goal: Acceptable Pain Control and Functional Ability  Intervention: Develop Pain Management Plan  Flowsheets (Taken 9/13/2023 1740)  Pain Management Interventions:   care clustered   cold applied   pain management plan reviewed with patient/caregiver     Problem: Pain Acute  Goal: Acceptable Pain Control and Functional Ability  Intervention: Prevent or Manage Pain  9/14/2023 0737 by Kailyn Montgomery RN  Flowsheets (Taken 9/14/2023 0737)  Sensory Stimulation Regulation:   care clustered   lighting decreased   quiet environment promoted  Medication Review/Management: medications reviewed  9/13/2023 1740 by Kailyn Montgomery RN  Flowsheets (Taken 9/13/2023 1740)  Sensory Stimulation Regulation:   quiet environment promoted   lighting decreased  Medication Review/Management: medications reviewed     Problem: Pain Acute  Goal: Acceptable Pain Control and Functional Ability  Intervention: Optimize Psychosocial Wellbeing  9/14/2023 0737 by Kailyn Montgomery RN  Flowsheets (Taken 9/14/2023 0737)  Supportive Measures:   active listening utilized   positive reinforcement provided  9/13/2023 1740 by Kailyn Montgoemry RN  Flowsheets (Taken 9/13/2023 1740)  Supportive Measures:   active listening utilized   positive reinforcement provided     Problem: Adjustment to Surgery (Knee Arthroplasty)  Goal: Optimal Coping  Intervention: Support Psychosocial Response to Surgery and Mobility Changes  9/14/2023 0737 by Kailyn Montgomery RN  Flowsheets (Taken 9/14/2023 0737)  Supportive Measures:   active listening utilized   positive reinforcement provided  9/13/2023 1740 by Kailyn Montgomery RN  Flowsheets (Taken 9/13/2023 1740)  Supportive Measures:   active listening utilized   positive reinforcement provided     Problem: Bleeding (Knee Arthroplasty)  Goal: Absence of Bleeding  Intervention: Monitor and Manage Bleeding  9/14/2023 0737 by Kailyn Montgomery RN  Flowsheets (Taken 9/14/2023  0737)  Bleeding Management: dressing monitored  9/13/2023 1740 by Kailyn Montgomery RN  Flowsheets (Taken 9/13/2023 1740)  Bleeding Management: dressing monitored     Problem: Infection (Knee Arthroplasty)  Goal: Absence of Infection Signs and Symptoms  Intervention: Prevent or Manage Infection  9/14/2023 0737 by Kailyn Montgomery RN  Flowsheets (Taken 9/14/2023 0737)  Infection Management: aseptic technique maintained  9/13/2023 1740 by Kailyn Montgomery RN  Flowsheets (Taken 9/13/2023 1740)  Infection Management: aseptic technique maintained     Problem: Pain (Knee Arthroplasty)  Goal: Acceptable Pain Control  Intervention: Prevent or Manage Pain  9/14/2023 0737 by Kailyn Montgomery RN  Flowsheets (Taken 9/14/2023 0737)  Pain Management Interventions:   care clustered   cold applied   pain management plan reviewed with patient/caregiver   quiet environment facilitated  9/13/2023 1740 by Kailyn Montgomery RN  Flowsheets (Taken 9/13/2023 1740)  Pain Management Interventions:   care clustered   cold applied   pain management plan reviewed with patient/caregiver     Problem: Fall Injury Risk  Goal: Absence of Fall and Fall-Related Injury  Intervention: Promote Injury-Free Environment  Flowsheets (Taken 9/14/2023 0737)  Safety Promotion/Fall Prevention:   assistive device/personal item within reach   Fall Risk reviewed with patient/family   lighting adjusted   nonskid shoes/socks when out of bed   side rails raised x 2   instructed to call staff for mobility    Plan of care reviewed with pt, verbalized understanding. Medications and possible side effects reviewed and administered as ordered.  Purposeful rounding completed.  Safety precautions maintained.  Call light placed within reach.   Preparing for discharge.

## 2023-09-14 NOTE — NURSING
Notified Dr. Manley of pt's manual b/p = 80/52 .  Order given for NS 500cc bolus and Calcium Gluconate 1 g  IVPB.

## 2023-09-14 NOTE — PLAN OF CARE
Problem: Adult Inpatient Plan of Care  Goal: Plan of Care Review  Outcome: Ongoing, Progressing  Flowsheets (Taken 9/14/2023 0442)  Plan of Care Reviewed With:   patient   daughter  Goal: Patient-Specific Goal (Individualized)  Outcome: Ongoing, Progressing  Flowsheets (Taken 9/14/2023 0442)  Anxieties, Fears or Concerns: Generalized  Individualized Care Needs: Pain management  Goal: Optimal Comfort and Wellbeing  Outcome: Ongoing, Progressing  Intervention: Monitor Pain and Promote Comfort  Flowsheets (Taken 9/14/2023 0442)  Pain Management Interventions:   care clustered   cold applied   pain management plan reviewed with patient/caregiver   pain pump in use   position adjusted   quiet environment facilitated   relaxation techniques promoted  Intervention: Provide Person-Centered Care  Flowsheets (Taken 9/14/2023 0442)  Trust Relationship/Rapport:   care explained   choices provided   questions answered   questions encouraged   reassurance provided   thoughts/feelings acknowledged  Goal: Readiness for Transition of Care  Outcome: Ongoing, Progressing     Problem: Bleeding (Knee Arthroplasty)  Goal: Absence of Bleeding  Outcome: Ongoing, Progressing  Intervention: Monitor and Manage Bleeding  Flowsheets (Taken 9/14/2023 0442)  Bleeding Management: dressing monitored     Problem: Functional Ability Impaired (Knee Arthroplasty)  Goal: Optimal Functional Ability  Outcome: Ongoing, Progressing  Intervention: Promote Optimal Functional Status  Flowsheets (Taken 9/14/2023 0442)  Self-Care Promotion:   BADL personal objects within reach   safe use of adaptive equipment encouraged  Assistive Device Utilized: walker  Activity Management:   Ambulated to bathroom - L4   Up in chair - L3   Walk with assistive devise and /or staff member - L3     Problem: Fall Injury Risk  Goal: Absence of Fall and Fall-Related Injury  Outcome: Ongoing, Progressing  Intervention: Identify and Manage Contributors  Flowsheets (Taken 9/14/2023  0445)  Self-Care Promotion:   BADL personal objects within reach   safe use of adaptive equipment encouraged  Medication Review/Management:   medications reviewed   high-risk medications identified

## 2023-09-14 NOTE — ADDENDUM NOTE
Addendum  created 09/14/23 0925 by Eveline Manley MD    Order list changed, Pharmacy for encounter modified

## 2023-09-14 NOTE — PT/OT/SLP PROGRESS
"Physical Therapy Treatment and Discharge    Patient Name:  Watson Castro   MRN:  [id number]    Recommendations:     Discharge Recommendations: outpatient PT  Discharge Equipment Recommendations: bath bench  Barriers to discharge: None    Assessment:     Watson Castro is a 68 y.o. female admitted with a medical diagnosis of Primary osteoarthritis of left knee.  She presents with the following impairments/functional limitations: weakness, impaired functional mobility, gait instability, impaired balance, decreased lower extremity function, pain, decreased ROM, orthopedic precautions. Patient tolerated PT session well. Patient ambulated 180ft x2 trials with RW and supervision. No LOB or SOB noted. Patient ascended/descended 6" curb step with RW and contact guard assistance. Patient performed L LE therex x10 reps. Patient has an OPPT appointment on 9/15/2023. Patient ready to discharge home from PT standpoint.      Rehab Prognosis: Good; patient would benefit from acute skilled PT services to address these deficits and reach maximum level of function.    Recent Surgery: Procedure(s) (LRB):  ARTHROPLASTY, KNEE, TOTAL - left - STEWART (Left) 1 Day Post-Op    Plan:     During this hospitalization, patient to be seen daily to address the identified rehab impairments via gait training, therapeutic activities, therapeutic exercises and progress toward the following goals:    Plan of Care Expires:  09/15/23    Subjective     Chief Complaint: Left knee pain.   Patient/Family Comments/goals: To walk without pain.   Pain/Comfort:  Pain Rating 1:  (yes but did not rate with number)  Location - Side 1: Left  Location 1: knee  Pain Addressed 1: Pre-medicate for activity, Reposition, Distraction      Objective:     Communicated with RN prior to session.  Patient found supine with cryotherapy, perineural catheter, FCD, peripheral IV (Nimbus) upon PT entry to room.  present during PT session.     General Precautions: Standard, " "fall  Orthopedic Precautions: LLE weight bearing as tolerated  Braces: N/A  Respiratory Status: Room air     Functional Mobility:  Bed Mobility:     Scooting: supervision  Supine to Sit: supervision  Sit to Supine: supervision  Mat Mobility:     Supine to Sit: supervision  Sit to Supine: supervision  Transfers:     Sit to Stand:  supervision with rolling walker x1 from bed and x1 from mat   Gait: Patient ambulated 180ft x2 trials with Rolling Walker and supervision using swing-through gait. Patient demonstrated decreased miles and decreased step length during gait due to pain, decreased ROM, and decreased strength.  Stairs:  Patient ascended/descended 6" curb step with RW and contact guard assistance.      AM-PAC 6 CLICK MOBILITY  Turning over in bed (including adjusting bedclothes, sheets and blankets)?: 4  Sitting down on and standing up from a chair with arms (e.g., wheelchair, bedside commode, etc.): 4  Moving from lying on back to sitting on the side of the bed?: 4  Moving to and from a bed to a chair (including a wheelchair)?: 4  Need to walk in hospital room?: 4  Climbing 3-5 steps with a railing?: 3  Basic Mobility Total Score: 23       Treatment & Education:  Patient educated in and performed L LE exercises x10 reps for ankle pumps, quad set, glute set, SAQ over bolster, heel slides, hip abd/add, SLR, and LAQ.     Patient and  educated in:  -PT role and POC  -safety with transfers including hand placement  -gait sequencing and RW management  -OOB activity to maximize recovery including ambulating at home to prevent DVT   -car transfer  -curb training  -HEP for therex at home with handout provided       Patient left supine with all lines intact, call button in reach, RN notified, and  present.    GOALS:   Multidisciplinary Problems       Physical Therapy Goals          Problem: Physical Therapy    Goal Priority Disciplines Outcome Goal Variances Interventions   Physical Therapy Goal     PT, " PT/OT Ongoing, Progressing     Description: Goals to be met by: 9/15/2023    Patient will increase functional independence with mobility by performin. Supine to sit with supervision  2. Sit to stand transfer with Supervision  3. Gait x200 feet with Supervision using Rolling Walker  4. Ascend/Descend 1 curb step with Stand by assistance using Rolling Walker  5. Lower extremity exercise program x30 reps per handout, with supervision                            Time Tracking:     PT Received On: 23  PT Start Time: 1130     PT Stop Time: 1156  PT Total Time (min): 26 min     Billable Minutes: Gait Training 16 and Therapeutic Exercise 10    Treatment Type: Treatment  PT/PTA: PT     Number of PTA visits since last PT visit: 0     2023

## 2023-09-14 NOTE — PT/OT/SLP PROGRESS
Occupational Therapy   Treatment and Discharge Note    Name: Watson Castro  MRN: [id number]  Admitting Diagnosis:  Primary osteoarthritis of left knee  1 Day Post-Op    Recommendations:     Discharge Recommendations: home  Discharge Equipment Recommendations:  bath bench  Barriers to discharge:  None    Assessment:     Watson Castro is a 68 y.o. female with a medical diagnosis of Primary osteoarthritis of left knee.  She presents with L TKA. Performance deficits affecting function are impaired self care skills, impaired functional mobility, orthopedic precautions. Pt was able to perform supine/sit c S and  Sit <> Stand Transfer with supervision with rolling walker Bed <> Chair Transfer using Stand Pivot technique with supervision with rolling walker Toilet Transfer Stand Pivot technique with supervision with rolling walker and bedside commode.  Able to perform UB/LB dressing c modified independence  Educated pt on bathing, car transfers, and cryotherapy.       Rehab Prognosis:  Good; patient would benefit from acute skilled OT services to address these deficits and reach maximum level of function.       Plan:     Patient to be seen daily to address the above listed problems via self-care/home management, therapeutic activities, therapeutic exercises  Plan of Care Expires: 09/14/23  Plan of Care Reviewed with: patient, spouse    Subjective     Chief Complaint: L TKA  Patient/Family Comments/goals: I'm ready.  Pain/Comfort:  Pain Rating 1: 0/10    Objective:     Communicated with: RN prior to session.  Patient found supine with cryotherapy, perineural catheter, FCD upon OT entry to room.    General Precautions: Standard, fall    Orthopedic Precautions:LLE weight bearing as tolerated  Braces: N/A  Respiratory Status: Room air     Occupational Performance:     Bed Mobility:    Patient completed Supine to Sit with supervision     Functional Mobility/Transfers:  Patient completed Sit <> Stand Transfer with supervision   with  rolling walker   Patient completed Bed <> Chair Transfer using Stand Pivot technique with supervision with rolling walker  Patient completed Toilet Transfer Stand Pivot technique with supervision with  rolling walker and bedside commode  Functional Mobility: Pt was able to walk to bathroom c S and RW.    Activities of Daily Living:  Upper Body Dressing: modified independence to don shirt.  Lower Body Dressing: modified independence to don underwear, pants, socks, and shoes.      [facility] 6 Click ADL: 24    Patient left up in chair with all lines intact, call button in reach, RN notified, and  present    GOALS:   Multidisciplinary Problems       Occupational Therapy Goals          Problem: Occupational Therapy    Goal Priority Disciplines Outcome Interventions   Occupational Therapy Goal     OT, PT/OT Ongoing, Progressing    Description: Goals to be met by: 9/14/23     Patient will increase functional independence with ADLs by performing:    UE Dressing with Modified [place].  LE Dressing with Modified [place] and Assistive Devices as needed.  Grooming while standing at sink with Modified [place].  Toileting from bedside commode with Modified [place] for hygiene and clothing management.   Bathing from  shower chair/bench with Modified [place].  Toilet transfer to bedside commode with Modified [place].                         Time Tracking:     OT Date of Treatment: 09/14/23  OT Start Time: 1315  OT Stop Time: 1351  OT Total Time (min): 36 min    Billable Minutes:Self Care/Home Management 18  Therapeutic Activity 18               9/14/2023

## 2023-09-14 NOTE — PROGRESS NOTES
Acute Pain Service and Perioperative Surgical Home Progress Note    HPI  Watson Castro is a 68 y.o., female, status post arthroplasty of the left knee.  Postoperatively the patient was hypotensive but asymptomatic.  She was given a 1 L fluid bolus and responded well.  Her blood pressure remained stable through the night.  Patient's pain is well controlled.  Good plantar and dorsiflexion.  Catheter site is clean and dry.  She is tolerating p.o..    Interval history      Surgery:  Procedure(s) (LRB):  ARTHROPLASTY, KNEE, TOTAL - left - STEWART (Left)    Post Op Day #: 1    Catheter type: Perineural Adductor Canal    Infusion type: Ropivacaine 0.2%, with a 4cc automatic bolus every 3 hours, combined with a 5 cc patient controlled bolus available [id number]    Problem List:    Active Hospital Problems    Diagnosis  POA    *Primary osteoarthritis of left knee [M17.12]  Yes      Resolved Hospital Problems   No resolved problems to display.       Subjective:       General appearance of no acute distress   Pain with rest: 1    Numbers   Pain with movement: 2    Numbers   Side Effects    1. Pruritis No    2. Nausea Yes    3. Motor Blockade Yes, 0=Ability to raise lower extremities off bed    4. Sedation No, 1=awake and alert    Schedule Medications:    acetaminophen  1,000 mg Oral Q6H    ascorbic acid (vitamin C)  1,000 mg Oral Daily    aspirin  81 mg Oral BID    atorvastatin  20 mg Oral Daily    celecoxib  200 mg Oral Daily    famotidine  20 mg Oral BID    methocarbamoL  750 mg Oral TID    mupirocin  1 g Nasal BID    polyethylene glycol  17 g Oral Daily    pregabalin  75 mg Oral QHS    senna-docusate 8.6-50 mg  1 tablet Oral BID        Continuous Infusions:   sodium chloride 0.9% 150 mL/hr at 09/14/23 0006    ropivacaine 0.2% [facility] Pump infusion 500 ML          PRN Medications:  bisacodyL, melatonin, morphine, naloxone, ondansetron, oxyCODONE, oxyCODONE, prochlorperazine       Antibiotics:  Antibiotics (From admission,  "onward)      Start     Stop Route Frequency Ordered    09/13/23 2100  mupirocin 2 % ointment 1 g         09/18/23 2059 Nasl 2 times daily 09/13/23 1546               Objective:     Catheter site clean, dry, intact          Vital Signs (Most Recent):  Temp: 97.8 °F (36.6 °C) (09/14/23 0415)  Pulse: 87 (09/14/23 0415)  Resp: 18 (09/14/23 0415)  BP: 136/63 (09/14/23 0415)  SpO2: 98 % (09/14/23 0415) Vital Signs Range (Last 24H):  Temp:  [97.4 °F (36.3 °C)-98.7 °F (37.1 °C)]   Pulse:  []   Resp:  [10-20]   BP: ()/(50-81)   SpO2:  [95 %-100 %]          I & O (Last 24H):  Intake/Output Summary (Last 24 hours) at 9/14/2023 0533  Last data filed at 9/14/2023 0224  Gross per 24 hour   Intake 4565 ml   Output 2050 ml   Net 2515 ml       Physical Exam:    GA: Alert, comfortable, no acute distress.   Pulmonary: Clear to auscultation. Normal RR.    Cardiac: regular rate and rhythm.      Laboratory: reviewed in chart  CBC: No results for input(s): "WBC", "RBC", "HGB", "HCT", "PLT", "MCV", "MCH", "MCHC" in the last 72 hours.    BMP: No results for input(s): "NA", "K", "CO2", "CL", "BUN", "CREATININE", "GLU", "MG", "PHOS", "CALCIUM" in the last 72 hours.    No results for input(s): "PT", "INR", "PROTIME", "APTT" in the last 72 hours.          Assessment:         Pain control adequate    Plan:     1) Pain: Adductor canal perineural catheter in place and infusing. Dressing in tact.  Multimodal pain regimen ordered which includes acetaminophen, celecoxib, pregabalin, and prn oxycodone.  Will continue to monitor. Plan to discharge with [facility] pain pump.   2) hyperlipidemia, continue home statin   3) PVD, no acute events overnight   4) FEN/GI: Tolerating regular diet.     5) Dispo: Pt working well with PT/OT. Case management and SW following along for setting up home health and physical therapy. Plan to discharge home this am.          Evaluator LAMONT OsheaC                  "

## 2023-09-14 NOTE — DISCHARGE SUMMARY
[facility])  Orthopedics  Discharge Summary      Patient Name: Watson Castro  MRN: [id number]  Admission Date: 9/13/2023  Hospital Length of Stay: 1 days  Discharge Date and Time:  09/14/2023 6:31 AM  Attending Physician: Iam Harris MD   Discharging Provider: SAPPHIRE HOANG MD  Primary Care Provider: Americo Cheung DO    HPI: Watson Castro is a 68 y.o. female with history of Left knee pain. Pain is worse with activity and weight bearing.  Patient has experienced interference of activities of daily living due to decreased range of motion and an increase in joint pain and swelling.  Patient has failed non-operative treatment including NSAIDs, corticosteroid injections, viscosupplement injections, and activity modification.  Watson Castro currently ambulates independently.      Relevant medical conditions of significance in perioperative period:  Iron deficiency anemia- on vitamins managed by pcp    Procedure(s) (LRB):  ARTHROPLASTY, KNEE, TOTAL - left - STEWART (Left)      Hospital Course: Patient presented for above procedure.  Tolerated it well and was discharged home POD1 after voiding, tolerating diet, ambulating, pain controlled. Discharge instructions, follow-up appointment, and med rec are below.      Consults (From admission, onward)          Status Ordering Provider     Inpatient consult to Social Work  Once        Provider:  (Not yet assigned)    Acknowledged ALONDRA LUCIANO     Inpatient consult to Pain Management  Once        Provider:  (Not yet assigned)    Acknowledged ALONDRA LUCIANO     Inpatient consult to Respiratory Care  Once        Provider:  (Not yet assigned)    Acknowledged ALONDRA LUCIANO            Significant Diagnostic Studies: No pertinent studies.    Pending Diagnostic Studies:       None          Final Active Diagnoses:    Diagnosis Date Noted POA    PRINCIPAL PROBLEM:  Primary osteoarthritis of left knee [M17.12] 08/31/2023 Yes      Problems  Resolved During this Admission:      Discharged Condition: good    Disposition: Home or Self Care    Follow Up:    Patient Instructions:      Diet general     Activity as tolerated     Sponge bath only until clinic visit     Keep surgical extremity elevated     Lifting restrictions   Order Comments: No strenuous exercise or lifting of > 10 lbs     Weight bearing as tolerated     No driving, operating heavy equipment or signing legal documents while taking pain medication     Leave dressing on - Keep it clean, dry, and intact until clinic visit   Order Comments: Do not remove surgical dressing for 2 weeks post-op. This will be done only by MD at initial post-op visit. If dressing is completely saturated, replace with identical dressing - silver-impregnated hydrocolloid dressing.     Do not get dressings wet. Do not shower.     If dressing continues to be saturated or there are signs of infection, please call [facility] [phone] for further instructions and to make appt to be seen.     Call MD for:  temperature >100.4     Call MD for:  persistent nausea and vomiting     Call MD for:  severe uncontrolled pain     Call MD for:  difficulty breathing, headache or visual disturbances     Call MD for:  redness, tenderness, or signs of infection (pain, swelling, redness, odor or green/yellow discharge around incision site)     Call MD for:  hives     Call MD for:  persistent dizziness or light-headedness     Call MD for:  extreme fatigue     Call MD for:  temperature >100.4     Call MD for:  persistent nausea and vomiting     Call MD for:  severe uncontrolled pain     Call MD for:  difficulty breathing, headache or visual disturbances     Call MD for:  redness, tenderness, or signs of infection (pain, swelling, redness, odor or green/yellow discharge around incision site)     Call MD for:  hives     Call MD for:  persistent dizziness or light-headedness     Call MD for:  extreme fatigue     Leave dressing on - Keep it  clean, dry, and intact until clinic visit     Medications:  Reconciled Home Medications:      Medication List        START taking these medications      acetaminophen 650 MG Tbsr  Commonly known as: TYLENOL  Take 1 tablet (650 mg total) by mouth every 8 (eight) hours.     celecoxib 200 MG capsule  Commonly known as: CeleBREX  Take 1 capsule (200 mg total) by mouth once daily.     docusate sodium 100 MG capsule  Commonly known as: COLACE  Take 1 capsule (100 mg total) by mouth 2 (two) times daily.     oxyCODONE 5 MG immediate release tablet  Commonly known as: ROXICODONE  Take 1 tablet (5 mg total) by mouth every 6 (six) hours as needed for Pain. May take 1-2 tablets every 6 hours as needed for pain            CHANGE how you take these medications      * aspirin 81 MG EC tablet  Commonly known as: ECOTRIN  Take 1 tablet (81 mg total) by mouth once daily.  What changed: Another medication with the same name was added. Make sure you understand how and when to take each.     * aspirin 81 MG EC tablet  Commonly known as: ECOTRIN  Take 1 tablet (81 mg total) by mouth 2 (two) times daily.  What changed: You were already taking a medication with the same name, and this prescription was added. Make sure you understand how and when to take each.           * This list has 2 medication(s) that are the same as other medications prescribed for you. Read the directions carefully, and ask your doctor or other care provider to review them with you.                CONTINUE taking these medications      ascorbic acid (vitamin C) 1000 MG tablet  Commonly known as: VITAMIN C  Take 1,000 mg by mouth once daily.     diclofenac sodium 1 % Gel  Commonly known as: VOLTAREN  APPLY 2 GRAMS TOPICALLY 3 TIMES DAILY     EB-P1 DR 0.4 mg- 15 mg Cpdr  Generic drug: mecobalamin-levomefolate calc  Take 1 capsule by mouth once daily.     FIBER ORAL  Take by mouth.     PROBIOTIC-10 ORAL  Take 1 tablet by mouth once daily.     rosuvastatin 20 MG  tablet  Commonly known as: CRESTOR  Take 1 tablet (20 mg total) by mouth every evening.     zinc gluconate 50 mg tablet  Take 50 mg by mouth once daily.            STOP taking these medications      naproxen 500 MG tablet  Commonly known as: KIMMY HOANG MD  Orthopedics  [facility])

## 2023-09-14 NOTE — NURSING
Nimbus Pump teaching done with pt and pt's spouse. Instructions given on how to remove catheter on day 5, Monday 9/18 at noon.  Verbalized understanding.  Pt was also made aware of toxicity side effects:  numbness or tingling around mouth, metallic taste in mouth, ringing in ears, sob, etc and instructed to clamp tubing and notify Anesthesia.  Pt made aware to call Anesthesia for any questions or complications with Nimbus Pump, phone numbers located on brochure and sticker on catheter site.  Extra gauze and tegaderm given to pt upon discharge.

## 2023-09-14 NOTE — PROGRESS NOTES
[facility])  Orthopedics  Progress Note    Patient Name: Watson Castro  MRN: [id number]  Admission Date: 9/13/2023  Hospital Length of Stay: 1 days  Attending Provider: Iam Harris MD  Primary Care Provider: Americo Cheung,   Follow-up For: Procedure(s) (LRB):  ARTHROPLASTY, KNEE, TOTAL - left - STEWART (Left)    Post-Operative Day: 1 Day Post-Op  Subjective:     Principal Problem:Primary osteoarthritis of left knee    Principal Orthopedic Problem: s/p L TKA    Interval History: NAEON, VSS, AF. Pain well controlled. Incision CDI. Voiding spontaneously. Nimbus in place. Walked 60 ft with PT yesterday.     Plan for discharge today with outpatient PT pending PT recs today.     Review of patient's allergies indicates:   Allergen Reactions    Sesame seed Anaphylaxis     Rash    Sulfa (sulfonamide antibiotics) Rash       Current Facility-Administered Medications   Medication    0.9%  NaCl infusion    acetaminophen tablet 1,000 mg    ascorbic acid (vitamin C) tablet 1,000 mg    atorvastatin tablet 20 mg    ceFAZolin 2 g in dextrose 5 % in water (D5W) 50 mL IVPB (MB+)    fentaNYL 50 mcg/mL injection 25 mcg    fentaNYL 50 mcg/mL injection  mcg    LIDOcaine (PF) 10 mg/ml (1%) injection 10 mg    melatonin tablet 6 mg    methocarbamoL tablet 750 mg    midazolam (VERSED) 1 mg/mL injection 0.5-4 mg    morphine injection 2 mg    naloxone 0.4 mg/mL injection 0.02 mg    ondansetron injection 4 mg    oxyCODONE immediate release tablet 10 mg    oxyCODONE immediate release tablet 5 mg    pregabalin capsule 75 mg    prochlorperazine injection Soln 5 mg    ropivacaine 0.2% Nimbus PainPRO Pump infusion 500 ML    sodium chloride 0.9% flush 10 mL    tranexamic acid (CYKLOKAPRON) 1,000 mg in sodium chloride 0.9 % 100 mL IVPB (MB+)     Facility-Administered Medications Ordered in Other Encounters   Medication    carboxymethylcellulose sodium 1 % DpGe    dexAMETHasone injection     famotidine (PF) injection    isolyte infusion    ketamine in 0.9 % sod chloride 50 mg/5 mL (10 mg/mL) injection    LIDOcaine (cardiac) injection    ondansetron injection    PHENYLephrine 10 mg in sodium chloride 0.9% 125 mL infusion    PHENYLephrine injection    propofol (DIPRIVAN) 10 mg/mL infusion    propofol (DIPRIVAN) 10 mg/mL infusion    sodium chloride 0.9% infusion     Objective:     Vital Signs (Most Recent):  Temp: 98.6 °F (37 °C) (09/13/23 0821)  Pulse: 91 (09/13/23 0950)  Resp: 18 (09/13/23 0950)  BP: (!) 147/57 (09/13/23 0950)  SpO2: 100 % (09/13/23 0950) Vital Signs (24h Range):  Temp:  [98.6 °F (37 °C)] 98.6 °F (37 °C)  Pulse:  [79-98] 91  Resp:  [10-20] 18  SpO2:  [95 %-100 %] 100 %  BP: (116-180)/(56-81) 147/57     Weight: 52.6 kg (116 lb)  Height: 5' (152.4 cm)  Body mass index is 22.65 kg/m².      Intake/Output Summary (Last 24 hours) at 9/13/2023 1303  Last data filed at 9/13/2023 1258  Gross per 24 hour   Intake 2600 ml   Output --   Net 2600 ml        Ortho/SPM Exam    General appearance - no acute distress, conversant  Musculoskeletal -  Dressing C/D/I  Fires quad/TA/EHL/GSC  SILT SP/DP/TN  WWP distally  Calves soft, nontender bilateral       Significant Labs: All pertinent labs within the past 24 hours have been reviewed.    Significant Imaging: I have reviewed and interpreted all pertinent imaging results/findings.    Assessment/Plan:     * Primary osteoarthritis of left knee  Watson Castro is a 68 y.o. female s/p left TKA 9/13/23.       Pain control: multimodal, pain pump in place  PT/OT: WBAT LLE  DVT PPx: asa 81 bid x 4 weeks, SCDs at all times when not ambulating  Abx: postop Ancef      Dispo: dc home with outpatient PT              SAPPHIRE HOANG MD  Orthopedics  [facility] ([facility])

## 2023-09-14 NOTE — PLAN OF CARE
Pt discharged from unit.  Pt aaox4, vss, no s/s of distress noted.  Dressing to left knee remains cdi. IV removed w/ catheter intact, no redness or swelling noted to area.  Discharge instructions given to pt and placed in d/c folder, pt verbalized understanding.  Home meds and f/u appts reviewed as well. Eqmt and meds delivered to bs prior to discharge. Blue Bracelet applied to pt's wrist and instructions given to call # on bracelet w/ any surgery related issues.   Pt left unit via w/c and was accompanied to front of hospital to meet ride. All personal belongings sent with pt.

## 2023-09-15 ENCOUNTER — CLINICAL SUPPORT (OUTPATIENT)
Dept: REHABILITATION | Facility: HOSPITAL | Age: 68
End: 2023-09-15
Payer: COMMERCIAL

## 2023-09-15 ENCOUNTER — PATIENT MESSAGE (OUTPATIENT)
Dept: ADMINISTRATIVE | Facility: OTHER | Age: 68
End: 2023-09-15
Payer: COMMERCIAL

## 2023-09-15 ENCOUNTER — CLINICAL SUPPORT (OUTPATIENT)
Dept: ORTHOPEDICS | Facility: CLINIC | Age: 68
End: 2023-09-15
Payer: COMMERCIAL

## 2023-09-15 DIAGNOSIS — M25.662 IMPAIRED RANGE OF MOTION OF LEFT KNEE: ICD-10-CM

## 2023-09-15 DIAGNOSIS — Z74.09 IMPAIRED FUNCTIONAL MOBILITY, BALANCE, GAIT, AND ENDURANCE: ICD-10-CM

## 2023-09-15 DIAGNOSIS — M17.12 PRIMARY OSTEOARTHRITIS OF LEFT KNEE: ICD-10-CM

## 2023-09-15 DIAGNOSIS — Z96.659 STATUS POST KNEE REPLACEMENT, UNSPECIFIED LATERALITY: Primary | ICD-10-CM

## 2023-09-15 PROCEDURE — 99499 UNLISTED E&M SERVICE: CPT | Mod: 95,COE,, | Performed by: ORTHOPAEDIC SURGERY

## 2023-09-15 PROCEDURE — 99499 NO LOS: ICD-10-PCS | Mod: 95,COE,, | Performed by: ORTHOPAEDIC SURGERY

## 2023-09-15 PROCEDURE — 97112 NEUROMUSCULAR REEDUCATION: CPT | Mod: PO

## 2023-09-15 PROCEDURE — 97161 PT EVAL LOW COMPLEX 20 MIN: CPT | Mod: PO

## 2023-09-15 PROCEDURE — 97110 THERAPEUTIC EXERCISES: CPT | Mod: PO

## 2023-09-15 NOTE — PLAN OF CARE
[facility]   Physical Therapy Initial Evaluation      Name: Watson Castro  Clinic Number: [id number]    Therapy Diagnosis:   Encounter Diagnoses   Name Primary?    Primary osteoarthritis of left knee     Impaired range of motion of left knee     Impaired functional mobility, balance, gait, and endurance         Physician: Americo Cheung DO    Physician Orders: PT Eval and Treat   Medical Diagnosis from Referral: Primary osteoarthritis of left knee  Evaluation Date: 9/15/2023  Authorization Period Expiration: 8/16/24  Plan of Care Expiration: 9/19/23  Progress Note Due: 9/19/23  Date of Surgery: 9/13/23  Visit # / Visits authorized: 1/ 1   FOTO: not performed.    Precautions: Standard     Time In:915 AM  Time Out: 956 AM  Total Billable Time: 41 minutes    Subjective     Date of onset: 9/13/23 L TKA     History of current condition - Watson reports: had L TKA on 9/13/23. She is currently walking with RW but was not prior to surgery.    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 [place]   Her  is a PT  Prior Level of Function: independent   Current Level of Function: limited post op     Pain:  Current 5/10, worst 8/10, best 2/10   Location: left knee   Description: Dull  Aggravating Factors: straightening the leg  Easing Factors: pain pump, ice, rest     Patients goals: to walk better without walker      Medical History:   Past Medical History:   Diagnosis Date    Iron deficiency anemia        Surgical History:   Watson Castro  has a past surgical history that includes Cataract extraction w/  intraocular lens implant (Right, 10/11/2022); Cataract extraction w/  intraocular lens implant (Left, 10/25/2022); and Total knee arthroplasty (Left, 9/13/2023).    Medications:   Watson has a current medication list which includes the following  "prescription(s): acetaminophen, ascorbic acid (vitamin c), aspirin, aspirin, celecoxib, diclofenac sodium, docusate sodium, lactobac no.41/bifidobact no.7, eb-p1 dr, oxycodone, psyllium seed (with sugar), rosuvastatin, and zinc gluconate.    Allergies:   Review of patient's allergies indicates:   Allergen Reactions    Sesame seed Anaphylaxis     Rash    Sulfa (sulfonamide antibiotics) Rash        Objective      Observation: Post-op dressing in place , pain pump and ice pack on L knee   Bleeding near pain pump line and pt educated to keep an eye on this and alert MD should this worsen.      Gait: FWW with antalgic gait pattern        Range of Motion:   Knee Right Left    Flexion 130 70 AROM, 80 PROM   Extension 1-2 deg hyperext Lacking 3 AROM, 0 deg PROM      Lower Extremity Strength:  Formal MMT not performed 2/2 POD#2 and increased pain.impaired quad activation noted L LE.     Special Tests:  Not performed 2/2 post-op.     Joint Mobility: NT     Palpation: no pitting edema         Flexibility:  Grossly hypomobile quad, hamstring and gastroc as expected on post-op L LE              Treatment     Total Treatment time (time-based codes) separate from Evaluation: 25 minutes     Watson received the treatments listed below:      therapeutic exercises to develop strength, endurance, and ROM for 10 minutes including:  Heel prop 3 mins   Ankle pumps elevated x30  Review of HEP  Education on use of RW        neuromuscular re-education activities to improve: quad motor control for 15 minutes. The following activities were included:  Quad set 20x5"  SAQ over full foam roll 2x10   SLR with PT assistance 3x5          Patient Education and Home Exercises     Education provided:   - HEP   - PT role and POC  - importance of full knee extension  - use of RW      Written Home Exercises Provided: yes. Exercises were reviewed and Watson was able to demonstrate them prior to the end of the session.  Watson demonstrated good  " understanding of the education provided. See EMR under Patient Instructions for exercises provided during therapy sessions.    Assessment     Watson is a 68 y.o. female referred to outpatient Physical Therapy with a medical diagnosis of Primary osteoarthritis of left knee. Pt is  s/p L TKA on 9/13/23. Patient presents with reduced knee left ROM, reduced knee strength, abnormal gait mechanics, and reduced functional capacity, all as expected per post op.   Patient prognosis is Excellent.   Patient will benefit from skilled outpatient Physical Therapy to address the deficits stated above and in the chart below, provide patient /family education, and to maximize patientt's level of independence.     Plan of care discussed with patient: Yes  Patient's spiritual, cultural and educational needs considered and patient is agreeable to the plan of care and goals as stated below:     Anticipated Barriers for therapy: none    Medical Necessity is demonstrated by the following  History  Co-morbidities and personal factors that may impact the plan of care [x] LOW: no personal factors / co-morbidities  [] MODERATE: 1-2 personal factors / co-morbidities  [] HIGH: 3+ personal factors / co-morbidities    Moderate / High Support Documentation:   Co-morbidities affecting plan of care: n/a    Personal Factors:   no deficits     Examination  Body Structures and Functions, activity limitations and participation restrictions that may impact the plan of care [] LOW: addressing 1-2 elements  [x] MODERATE: 3+ elements  [] HIGH: 4+ elements (please support below)    Moderate / High Support Documentation: post op limtiations     Clinical Presentation [x] LOW: stable  [] MODERATE: Evolving  [] HIGH: Unstable     Decision Making/ Complexity Score: low       Goals:    Short Term Goals:  1 weeks  1. Increase active left knee extension to 0 deg actively to improve movement patterns.  2. Pt will demonstrate 50% recall of HEP with no assistance  from therapist.   3. Pt will demo proper use of RW and gait sequencing to improve mobility     Long Term Goals: 2 weeks  Increase left knee flexion AROM to >/=90 in order to be able to perform ADLs without difficulty.  2. Pt will demonstrate 100% recall of HEP with no assistance from therapist.      Plan     Patient will be seen for 2 more visits per [facility] knee protocol.    Sarah Beth Ignacio, PT        Physician's Signature: _________________________________________ Date: ________________

## 2023-09-15 NOTE — PLAN OF CARE
[facility])  Discharge Final Note    Primary Care Provider: Americo Cheung DO    Expected Discharge Date: 9/14/2023    Final Discharge Note (most recent)       Final Note - 09/14/23 1538          Final Note    Assessment Type Final Discharge Note     Anticipated Discharge Disposition Home or Self Care     Hospital Resources/Appts/Education Provided Provided patient/caregiver with written discharge plan information;Provided education on problems/symptoms using teachback                   Future Appointments   Date Time Provider Department Center   9/15/2023  3:30 PM TELEMED NURSE, TE ORTHO [facility] ORTHO [place] [facility]   9/18/2023  9:15 AM Sarah Beth Ignacio, PT [facility]   9/19/2023  9:00 AM Sarah Beth Ignacio, PT [facility]   9/26/2023 10:20 AM Waleska Gomez NP [facility] ORTHO [place] geraldine [facility]   12/11/2023  8:30 AM SPECIMEN LAB, [facility] [place]   1/11/2024  9:20 AM Tomi Hu Jr., MD [facility] CARDIO [place]

## 2023-09-15 NOTE — PROGRESS NOTES
"[facility]   Physical Therapy Treatment Note      Name: Watson Castro  Clinic Number: [id number]    Therapy Diagnosis:   Encounter Diagnoses   Name Primary?    Impaired range of motion of left knee Yes    Impaired functional mobility, balance, gait, and endurance      Physician: Americo Cheung DO    Visit Date: 9/18/2023    Physician Orders: PT Eval and Treat   Medical Diagnosis from Referral: Primary osteoarthritis of left knee  Evaluation Date: 9/15/2023  Authorization Period Expiration: 8/16/24  Plan of Care Expiration: 9/19/23  Progress Note Due: 9/19/23  Date of Surgery: 9/13/23  Visit # / Visits authorized: 2/  FOTO: not performed.     Precautions: Standard      Time In:915 AM  Time Out: 952 AM  Total Billable Time: 37 minutes (TE-2, NMR-1)      Subjective     Patient reports: no new complaints, feels she is getting better.  She was compliant with home exercise program.  Response to previous treatment: pain when she went home   Functional change: in progress-feels she is walking much better    Pain: 5-6/10  Location: left knee      Objective      0-90 supine L knee ROM    Treatment     Watson received the treatments listed below:         therapeutic exercises to develop strength, endurance, and ROM for 23 minutes including:  Heel prop 4 mins   +hooklying hip add with ball 30x3"  +Hooklying clams RTB 2x10  +seated heel slides 3 mins   Ankle pumps elevated x30- not performed today  Review of HEP          neuromuscular re-education activities to improve: quad motor control for 14 minutes. The following activities were included:  Quad set 20x5"  SAQ +over red bolster 4x5  SLR 4x5   +LAQ 2x10         Patient Education and Home Exercises       Education provided:   -HEP   - pt educated on all interventions performed today       Written Home Exercises Provided: yes. Exercises were reviewed and Watson was able to demonstrate them prior to the end of the session.  Watson" demonstrated good  understanding of the education provided. See Electronic Medical Record under Patient Instructions for exercises provided during therapy sessions    Assessment     Mrs. Castro is progressing very well at this time. She shows great improvement in supine active range of motion of the left knee. She demonstrates improved exercise tolerance as well. She also demos improved quad facilitation, being able to perform SLR without assistance today (though with quad lag) and LAQs . Added hooklying hip adduction and abduction today to improve upon hip strength. Progress as tolerated.    Watson Is progressing well towards her goals.   Patient prognosis is Excellent.     Patient will continue to benefit from skilled outpatient physical therapy to address the deficits listed in the problem list box on initial evaluation, provide pt/family education and to maximize pt's level of independence in the home and community environment.     Patient's spiritual, cultural and educational needs considered and pt agreeable to plan of care and goals.     Anticipated barriers to physical therapy: none    Goals:     Short Term Goals:  1 weeks  1. Increase active left knee extension to 0 deg actively to improve movement patterns- MET  2. Pt will demonstrate 50% recall of HEP with no assistance from therapist. -MET  3. Pt will demo proper use of RW and gait sequencing to improve mobility- MET     Long Term Goals: 2 weeks  Increase left knee flexion AROM to >/=90 in order to be able to perform ADLs without difficulty.- MET  2. Pt will demonstrate 100% recall of HEP with no assistance from therapist.        Plan     Patient will be seen for 1 more visit per [facility] knee protocol.       Sarah Beth Ignacio, PT

## 2023-09-15 NOTE — PROGRESS NOTES
I called the patient today regarding her surgery with Dr. Iam Harris . The patient had a left TKA on 9/13.     Pain Scale: 5-7 / 10    Any issues with Fever: No.    Any issues with medications (specifically DVT prophylaxis): No. ASA 81 BID    Any issues with bowel movements:  Passing nicole: No.                                                                 Urination: No.                                                                 Constipation: No. Last BM 9/15    Completing at home exercises: Yes:     Any concerns regarding their dressing/bandage:  No.    Patient confirmed first OP-PT appointment:  [facility] on  9/15 at 0915 am.     Any other concerns: Yes:  Nimbus pump is leaking onto her shirt. They called anesthesia at 1100 and left a message. They have not been called back. Reached out to supervisor at [facility]. Dr. Nino will call the pt.        The patient was informed that if they have any urgent issues with their bandage, medications or any other health concerns regarding their surgery to call the 24/7 Orthopedic Post-op Hot Line at [phone]. The patient was reminded that if they have any chest pain or shortness of breath to call 911 or go to the ER.    The patient verbalized understanding and does not have any other questions

## 2023-09-17 ENCOUNTER — PATIENT MESSAGE (OUTPATIENT)
Dept: ADMINISTRATIVE | Facility: OTHER | Age: 68
End: 2023-09-17
Payer: COMMERCIAL

## 2023-09-18 ENCOUNTER — CLINICAL SUPPORT (OUTPATIENT)
Dept: REHABILITATION | Facility: HOSPITAL | Age: 68
End: 2023-09-18
Payer: COMMERCIAL

## 2023-09-18 ENCOUNTER — PATIENT MESSAGE (OUTPATIENT)
Dept: ADMINISTRATIVE | Facility: OTHER | Age: 68
End: 2023-09-18
Payer: COMMERCIAL

## 2023-09-18 DIAGNOSIS — Z74.09 IMPAIRED FUNCTIONAL MOBILITY, BALANCE, GAIT, AND ENDURANCE: ICD-10-CM

## 2023-09-18 DIAGNOSIS — M25.662 IMPAIRED RANGE OF MOTION OF LEFT KNEE: Primary | ICD-10-CM

## 2023-09-18 PROCEDURE — 97112 NEUROMUSCULAR REEDUCATION: CPT | Mod: PO

## 2023-09-18 PROCEDURE — 97110 THERAPEUTIC EXERCISES: CPT | Mod: PO

## 2023-09-18 NOTE — PROGRESS NOTES
"[facility]   Physical Therapy Treatment Note / Discharge Note     Name: Watson Castro  Clinic Number: [id number]    Therapy Diagnosis:   Encounter Diagnoses   Name Primary?    Impaired range of motion of left knee Yes    Impaired functional mobility, balance, gait, and endurance        Physician: Americo Cheung DO    Visit Date: 9/19/2023    Physician Orders: PT Eval and Treat   Medical Diagnosis from Referral: Primary osteoarthritis of left knee  Evaluation Date: 9/15/2023  Authorization Period Expiration: 8/16/24  Plan of Care Expiration: 9/19/23  Progress Note Due: 9/19/23  Date of Surgery: 9/13/23  Visit # / Visits authorized: 3/  FOTO: not performed.     Precautions: Standard      Time In: 858 AM  Time Out: 944 AM  Total Billable Time: 46 minutes (TE-1, NMR-1, TA-1)      Subjective     Patient reports: she took 1 pain pill this morning. She took off the pain pump last night.   She was compliant with home exercise program.  Response to previous treatment: pain when she went home   Functional change: in progress-feels she is walking much better    Pain: 5/10  Location: left knee      Objective      0-95 supine L knee ROM    Treatment     Watson received the treatments listed below:      Bold=performed        therapeutic exercises to develop strength, endurance, and ROM for 15 minutes including:  Heel prop 4 mins   hooklying hip add with ball 30x3"  Hooklying clams RTB 2x10  seated heel slides 3 mins   Ankle pumps elevated x30- not performed today  Review of HEP, answered all questions          neuromuscular re-education activities to improve: quad motor control for 15 minutes. The following activities were included:  Quad set 20x5"  SAQ over red bolster 2x10  SLR 2x10  LAQ 2x10     Therapeutic activities for functional performance  x15 mins  +sit <> stands  with BUE assist 2x10  +step ups to 4inch step with BUE assist at head of mat 2x10      Patient Education and Home " Exercises       Education provided:   -HEP   - pt educated on all interventions performed today       Written Home Exercises Provided: Patient instructed to cont prior HEP. Exercises were reviewed and Watson was able to demonstrate them prior to the end of the session.  Watson demonstrated good  understanding of the education provided. See Electronic Medical Record under Patient Instructions for exercises provided during therapy sessions    Assessment     Mrs. Castro is continuing to progress very well at this time and shows great improvement in ROM and quad facilitation. She is able to perform sit <> stands with BUE assist with cuing for proper eccentric control and equal weight bearing between bilateral lower extremity. Added step ups onto 4 inch step for functional strength. Per [facility] knee protocol, pt is discharged today. She will no longer be attending outpatient PT after today but her  is a physical therapist so she will work with him at home to maintain her progress.        Goals:     Short Term Goals:  1 weeks  1. Increase active left knee extension to 0 deg actively to improve movement patterns- MET  2. Pt will demonstrate 50% recall of HEP with no assistance from therapist. -MET  3. Pt will demo proper use of RW and gait sequencing to improve mobility- MET     Long Term Goals: 2 weeks  Increase left knee flexion AROM to >/=90 in order to be able to perform ADLs without difficulty.- MET  2. Pt will demonstrate 100% recall of HEP with no assistance from therapist.        Plan     Patient is discharged today per [facility] knee protocol.       Sarah Beth Ignacio, PT

## 2023-09-19 ENCOUNTER — CLINICAL SUPPORT (OUTPATIENT)
Dept: REHABILITATION | Facility: HOSPITAL | Age: 68
End: 2023-09-19
Payer: COMMERCIAL

## 2023-09-19 ENCOUNTER — PATIENT MESSAGE (OUTPATIENT)
Dept: ADMINISTRATIVE | Facility: OTHER | Age: 68
End: 2023-09-19
Payer: COMMERCIAL

## 2023-09-19 DIAGNOSIS — Z74.09 IMPAIRED FUNCTIONAL MOBILITY, BALANCE, GAIT, AND ENDURANCE: ICD-10-CM

## 2023-09-19 DIAGNOSIS — M25.662 IMPAIRED RANGE OF MOTION OF LEFT KNEE: Primary | ICD-10-CM

## 2023-09-19 PROCEDURE — 97110 THERAPEUTIC EXERCISES: CPT | Mod: PO

## 2023-09-19 PROCEDURE — 97530 THERAPEUTIC ACTIVITIES: CPT | Mod: PO

## 2023-09-19 PROCEDURE — 97112 NEUROMUSCULAR REEDUCATION: CPT | Mod: PO

## 2023-09-21 ENCOUNTER — TELEPHONE (OUTPATIENT)
Dept: ORTHOPEDICS | Facility: CLINIC | Age: 68
End: 2023-09-21
Payer: COMMERCIAL

## 2023-09-21 ENCOUNTER — PATIENT MESSAGE (OUTPATIENT)
Dept: ADMINISTRATIVE | Facility: OTHER | Age: 68
End: 2023-09-21
Payer: COMMERCIAL

## 2023-09-21 NOTE — TELEPHONE ENCOUNTER
1 week p/o call made -  patient doing well overall, has had BM, some swelling/bruising as is expected, using ice and elevating, no questions about medications, ambulating well with walker -  thinks she's ready to move to cane and will ask about this at appt on Tuesday  Did PT on 9/15, 9/18, 9/18 and have no further appointments scheduled.  Pt's  is a retired PT and states he's been doing PT with her at home.

## 2023-09-22 ENCOUNTER — PATIENT MESSAGE (OUTPATIENT)
Dept: ADMINISTRATIVE | Facility: OTHER | Age: 68
End: 2023-09-22
Payer: COMMERCIAL

## 2023-09-23 ENCOUNTER — PATIENT MESSAGE (OUTPATIENT)
Dept: ADMINISTRATIVE | Facility: OTHER | Age: 68
End: 2023-09-23
Payer: COMMERCIAL

## 2023-09-25 ENCOUNTER — PATIENT MESSAGE (OUTPATIENT)
Dept: ADMINISTRATIVE | Facility: OTHER | Age: 68
End: 2023-09-25
Payer: COMMERCIAL

## 2023-09-26 ENCOUNTER — PATIENT MESSAGE (OUTPATIENT)
Dept: ADMINISTRATIVE | Facility: OTHER | Age: 68
End: 2023-09-26
Payer: COMMERCIAL

## 2023-09-26 ENCOUNTER — OFFICE VISIT (OUTPATIENT)
Dept: ORTHOPEDICS | Facility: CLINIC | Age: 68
End: 2023-09-26
Payer: COMMERCIAL

## 2023-09-26 DIAGNOSIS — Z96.652 S/P TOTAL KNEE REPLACEMENT USING CEMENT, LEFT: Primary | ICD-10-CM

## 2023-09-26 PROCEDURE — 99024 PR POST-OP FOLLOW-UP VISIT: ICD-10-PCS | Mod: S$GLB,COE,, | Performed by: NURSE PRACTITIONER

## 2023-09-26 PROCEDURE — 99999 PR PBB SHADOW E&M-EST. PATIENT-LVL III: ICD-10-PCS | Mod: PBBFAC,COE,, | Performed by: NURSE PRACTITIONER

## 2023-09-26 PROCEDURE — 99999 PR PBB SHADOW E&M-EST. PATIENT-LVL III: CPT | Mod: PBBFAC,COE,, | Performed by: NURSE PRACTITIONER

## 2023-09-26 PROCEDURE — 99024 POSTOP FOLLOW-UP VISIT: CPT | Mod: S$GLB,COE,, | Performed by: NURSE PRACTITIONER

## 2023-09-26 RX ORDER — METHOCARBAMOL 750 MG/1
750 TABLET, FILM COATED ORAL 4 TIMES DAILY
Qty: 40 TABLET | Refills: 0 | Status: SHIPPED | OUTPATIENT
Start: 2023-09-26 | End: 2023-10-06

## 2023-09-26 RX ORDER — OXYCODONE HYDROCHLORIDE 5 MG/1
TABLET ORAL
Qty: 40 TABLET | Refills: 0 | Status: SHIPPED | OUTPATIENT
Start: 2023-09-26 | End: 2023-10-09 | Stop reason: SDUPTHER

## 2023-09-26 NOTE — PROGRESS NOTES
Nbaalisha READ Matthew presents for initial post-operative visit following a left total knee arthroplasty performed by Dr. Harris on 9/13/2023. She is a [facility] patient, so Dr. Harris came to the room to evaluate as well. She lives locally and will continue to f/u with us    Exam:     Ambulating well with assistive device.  Incision is clean and dry without drainage or erythema.   ROM:0-110    Initial post-operative radiographs reviewed today revealing a well fixed and aligned prosthesis.    A/P:  2 weeks s/p left total knee replacement  - The patient was advised to keep the incision clean and dry for the next 24 hours after which she may wash the area with antibacterial soap in the shower. Will not submerge incision until one month post op.  - Outpatient PT: to continue with her  who is a physical therapist  - Continue aspirin for 1 month post op.  - Pain medication refilled  - Follow up in 4 weeks with surgeon. Pt will call clinic with problems/concerns.

## 2023-10-09 DIAGNOSIS — Z96.652 S/P TOTAL KNEE REPLACEMENT USING CEMENT, LEFT: ICD-10-CM

## 2023-10-09 RX ORDER — CELECOXIB 200 MG/1
200 CAPSULE ORAL DAILY
Qty: 30 CAPSULE | Refills: 0 | Status: SHIPPED | OUTPATIENT
Start: 2023-10-09 | End: 2023-11-13

## 2023-10-09 RX ORDER — DOCUSATE SODIUM 100 MG/1
100 CAPSULE, LIQUID FILLED ORAL 2 TIMES DAILY
Qty: 60 CAPSULE | Refills: 0 | Status: SHIPPED | OUTPATIENT
Start: 2023-10-09 | End: 2023-11-08

## 2023-10-09 RX ORDER — OXYCODONE HYDROCHLORIDE 5 MG/1
TABLET ORAL
Qty: 28 TABLET | Refills: 0 | Status: SHIPPED | OUTPATIENT
Start: 2023-10-09 | End: 2023-10-17 | Stop reason: SDUPTHER

## 2023-10-09 RX ORDER — METHOCARBAMOL 750 MG/1
750 TABLET, FILM COATED ORAL 4 TIMES DAILY PRN
Qty: 40 TABLET | Refills: 0 | Status: SHIPPED | OUTPATIENT
Start: 2023-10-09 | End: 2023-10-19

## 2023-10-11 ENCOUNTER — PATIENT MESSAGE (OUTPATIENT)
Dept: ADMINISTRATIVE | Facility: OTHER | Age: 68
End: 2023-10-11
Payer: COMMERCIAL

## 2023-10-14 ENCOUNTER — PATIENT MESSAGE (OUTPATIENT)
Dept: ADMINISTRATIVE | Facility: OTHER | Age: 68
End: 2023-10-14
Payer: COMMERCIAL

## 2023-10-16 DIAGNOSIS — Z96.652 S/P TOTAL KNEE REPLACEMENT USING CEMENT, LEFT: Primary | ICD-10-CM

## 2023-10-17 DIAGNOSIS — Z96.652 S/P TOTAL KNEE REPLACEMENT USING CEMENT, LEFT: ICD-10-CM

## 2023-10-17 RX ORDER — OXYCODONE HYDROCHLORIDE 5 MG/1
TABLET ORAL
Qty: 28 TABLET | Refills: 0 | Status: SHIPPED | OUTPATIENT
Start: 2023-10-17 | End: 2023-10-25 | Stop reason: SDUPTHER

## 2023-10-19 ENCOUNTER — OFFICE VISIT (OUTPATIENT)
Dept: ORTHOPEDICS | Facility: CLINIC | Age: 68
End: 2023-10-19
Payer: COMMERCIAL

## 2023-10-19 ENCOUNTER — HOSPITAL ENCOUNTER (OUTPATIENT)
Dept: RADIOLOGY | Facility: HOSPITAL | Age: 68
Discharge: HOME | End: 2023-10-19
Attending: ORTHOPAEDIC SURGERY
Payer: COMMERCIAL

## 2023-10-19 DIAGNOSIS — Z96.652 S/P TOTAL KNEE REPLACEMENT USING CEMENT, LEFT: ICD-10-CM

## 2023-10-19 DIAGNOSIS — Z96.652 STATUS POST TOTAL LEFT KNEE REPLACEMENT: Primary | ICD-10-CM

## 2023-10-19 PROCEDURE — 73562 XR KNEE 3 VIEW LEFT: ICD-10-PCS | Mod: 26,LT,, | Performed by: RADIOLOGY

## 2023-10-19 PROCEDURE — 99024 POSTOP FOLLOW-UP VISIT: CPT | Mod: S$GLB,COE,, | Performed by: ORTHOPAEDIC SURGERY

## 2023-10-19 PROCEDURE — 99999 PR PBB SHADOW E&M-EST. PATIENT-LVL III: ICD-10-PCS | Mod: PBBFAC,COE,, | Performed by: ORTHOPAEDIC SURGERY

## 2023-10-19 PROCEDURE — 99999 PR PBB SHADOW E&M-EST. PATIENT-LVL III: CPT | Mod: PBBFAC,COE,, | Performed by: ORTHOPAEDIC SURGERY

## 2023-10-19 PROCEDURE — 73562 X-RAY EXAM OF KNEE 3: CPT | Mod: TC,LT

## 2023-10-19 PROCEDURE — 73562 X-RAY EXAM OF KNEE 3: CPT | Mod: 26,LT,, | Performed by: RADIOLOGY

## 2023-10-19 PROCEDURE — 99024 PR POST-OP FOLLOW-UP VISIT: ICD-10-PCS | Mod: S$GLB,COE,, | Performed by: ORTHOPAEDIC SURGERY

## 2023-10-19 RX ORDER — METHOCARBAMOL 750 MG/1
TABLET, FILM COATED ORAL
Qty: 40 TABLET | Refills: 0 | Status: SHIPPED | OUTPATIENT
Start: 2023-10-19 | End: 2023-10-30

## 2023-10-23 NOTE — PROGRESS NOTES
Subjective:      Patient ID: Watson Castro is a 68 y.o. female.    Chief Complaint:   Post-op Evaluation of the Left Knee    HPI  She returns about 6 weeks out from left TKA.  She still has some occasional pain with exercise, but she is generally pleased with her progress.  Her  is a retired physical therapist, and they are doing her therapy on their own at home.      Objective:        Ortho/SPM Exam    On exam, the scar is well healed without redness or tenderness.  There is no effusion.  Active range of motion is 0-5-105° without crepitus.  No instability to varus/valgus stress in extension or flexion.  No excessive sagittal plane instability.  Calves soft, nontender.  Distal neurovascular intact.        IMAGING:  Weightbearing x-rays show well-fixed and positioned total knee arthroplasty components without signs of loosening or other complications.  Assessment:   Progressing well status post left TKA      Plan:   They are urged to redouble their efforts at eliminating her extensor lag.  We went over home exercises in some detail as well as her pain management regimen.  Return in 6 weeks for 12 week visit    The patient's pathophysiology was explained in detail with reference to x-rays, models, other visual aids as appropriate.  Treatment options were discussed in detail.  Questions were invited and answered to the patient's satisfaction.      Iam Harris MD  Orthopedic Surgery

## 2023-10-25 DIAGNOSIS — Z96.652 S/P TOTAL KNEE REPLACEMENT USING CEMENT, LEFT: ICD-10-CM

## 2023-10-25 RX ORDER — OXYCODONE HYDROCHLORIDE 5 MG/1
TABLET ORAL
Qty: 28 TABLET | Refills: 0 | Status: SHIPPED | OUTPATIENT
Start: 2023-10-25 | End: 2023-11-07 | Stop reason: SDUPTHER

## 2023-10-25 RX ORDER — DEXTROMETHORPHAN HYDROBROMIDE, GUAIFENESIN 5; 100 MG/5ML; MG/5ML
650 LIQUID ORAL EVERY 8 HOURS
Qty: 120 TABLET | Refills: 0 | Status: SHIPPED | OUTPATIENT
Start: 2023-10-25

## 2023-10-27 PROBLEM — Z96.652 STATUS POST TOTAL LEFT KNEE REPLACEMENT: Status: ACTIVE | Noted: 2023-10-27

## 2023-10-30 DIAGNOSIS — Z96.652 S/P TOTAL KNEE REPLACEMENT USING CEMENT, LEFT: ICD-10-CM

## 2023-10-30 RX ORDER — METHOCARBAMOL 750 MG/1
TABLET, FILM COATED ORAL
Qty: 40 TABLET | Refills: 0 | Status: SHIPPED | OUTPATIENT
Start: 2023-10-30 | End: 2023-11-20

## 2023-11-07 ENCOUNTER — OFFICE VISIT (OUTPATIENT)
Dept: ORTHOPEDICS | Facility: CLINIC | Age: 68
End: 2023-11-07
Payer: COMMERCIAL

## 2023-11-07 VITALS — WEIGHT: 118.5 LBS | BODY MASS INDEX: 23.26 KG/M2 | HEIGHT: 60 IN

## 2023-11-07 DIAGNOSIS — Z96.652 STATUS POST TOTAL LEFT KNEE REPLACEMENT: Primary | ICD-10-CM

## 2023-11-07 DIAGNOSIS — Z96.652 S/P TOTAL KNEE REPLACEMENT USING CEMENT, LEFT: ICD-10-CM

## 2023-11-07 PROCEDURE — 99999 PR PBB SHADOW E&M-EST. PATIENT-LVL III: ICD-10-PCS | Mod: PBBFAC,COE,, | Performed by: ORTHOPAEDIC SURGERY

## 2023-11-07 PROCEDURE — 99999 PR PBB SHADOW E&M-EST. PATIENT-LVL III: CPT | Mod: PBBFAC,COE,, | Performed by: ORTHOPAEDIC SURGERY

## 2023-11-07 PROCEDURE — 99024 POSTOP FOLLOW-UP VISIT: CPT | Mod: S$GLB,COE,, | Performed by: ORTHOPAEDIC SURGERY

## 2023-11-07 PROCEDURE — 99024 PR POST-OP FOLLOW-UP VISIT: ICD-10-PCS | Mod: S$GLB,COE,, | Performed by: ORTHOPAEDIC SURGERY

## 2023-11-07 RX ORDER — OXYCODONE HYDROCHLORIDE 5 MG/1
TABLET ORAL
Qty: 28 TABLET | Refills: 0 | Status: SHIPPED | OUTPATIENT
Start: 2023-11-07

## 2023-11-07 NOTE — LETTER
November 7, 2023      [facility] 5th [facility]  [address], [address]  [facility] [address]  Phone: [phone]       Patient: Watson Castro   YOB: 1955  Date of Visit: 11/07/2023    To Whom It May Concern:    Cindy Castro  was at [facility] on 11/07/2023. The patient may not return to work.  She will be reevaluated in 4 weeks.  If you have any questions or concerns, or if I can be of further assistance, please do not hesitate to contact me.    Sincerely,    Iam Harris MD

## 2023-11-09 NOTE — PROGRESS NOTES
Subjective:      Patient ID: Watson Castro is a 68 y.o. female.    Chief Complaint:   Post-op Evaluation (12 week post-op L TKA)    HPI  She returns about 12 weeks out from left TKA.  Her pain ranges only up to 3/10, and she is pleased with her functional progress thus far.  No new symptoms to report      Objective:        Ortho/SPM Exam    On exam, the scar is well healed without redness or tenderness.  There is no effusion.  Active range of motion is 0-115° without crepitus.  No instability to varus/valgus stress in extension or flexion.  No excessive sagittal plane instability.  Calves soft, nontender.  Distal neurovascular intact.        IMAGING:  None today  Assessment:   Progressing well status post left TKA      Plan:   Anniversary follow-up with x-rays    The patient's pathophysiology was explained in detail with reference to x-rays, models, other visual aids as appropriate.  Treatment options were discussed in detail.  Questions were invited and answered to the patient's satisfaction.      Iam Harris MD  Orthopedic Surgery

## 2023-11-13 DIAGNOSIS — Z96.652 S/P TOTAL KNEE REPLACEMENT USING CEMENT, LEFT: ICD-10-CM

## 2023-11-13 RX ORDER — CELECOXIB 200 MG/1
200 CAPSULE ORAL
Qty: 30 CAPSULE | Refills: 0 | Status: SHIPPED | OUTPATIENT
Start: 2023-11-13

## 2023-11-16 DIAGNOSIS — Z96.652 STATUS POST TOTAL LEFT KNEE REPLACEMENT: Primary | ICD-10-CM

## 2023-11-16 RX ORDER — AMOXICILLIN 500 MG/1
TABLET, FILM COATED ORAL
Qty: 4 TABLET | Refills: 0 | Status: SHIPPED | OUTPATIENT
Start: 2023-11-16 | End: 2023-11-27

## 2023-11-17 ENCOUNTER — TELEPHONE (OUTPATIENT)
Dept: ORTHOPEDICS | Facility: CLINIC | Age: 68
End: 2023-11-17
Payer: COMMERCIAL

## 2023-11-17 NOTE — TELEPHONE ENCOUNTER
Called and spoke to pt advised antibiotics were sent to pharmacy 11/16. Pt understood conversation.    ----- Message -----  From: Jaci Poon  Sent: 11/16/2023   3:39 PM CST  To: Kimberly DENNISON Staff    Dental office calling to see if pt needs any antibiotics for her scheduled cleaning  on 11-21         Call back [phone]  fax [phone]

## 2023-11-17 NOTE — TELEPHONE ENCOUNTER
Called and spoke to pt regarding paper work. Advised it was signed and faxed. Pt was thankful and understood conversation   ----- Message -----  From: Laila Santana  Sent: 11/15/2023   1:37 PM CST  To: Kimberly DENNISON Staff  Subject: Pt Advice                                        Pt is calling to request forms that was given during last visit for surgery clearance/return to work certification, for employer with return to date 12/9, please fax to [phone], please call pt @[phone]

## 2023-11-20 ENCOUNTER — LAB VISIT (OUTPATIENT)
Dept: LAB | Facility: HOSPITAL | Age: 68
End: 2023-11-20
Payer: COMMERCIAL

## 2023-11-20 DIAGNOSIS — Z79.899 ENCOUNTER FOR LONG-TERM (CURRENT) USE OF MEDICATIONS: ICD-10-CM

## 2023-11-20 DIAGNOSIS — D50.8 OTHER IRON DEFICIENCY ANEMIA: ICD-10-CM

## 2023-11-20 DIAGNOSIS — Z96.652 S/P TOTAL KNEE REPLACEMENT USING CEMENT, LEFT: ICD-10-CM

## 2023-11-20 LAB
ALBUMIN SERPL BCP-MCNC: 3.8 G/DL (ref 3.5–5.2)
ALP SERPL-CCNC: 103 U/L (ref 55–135)
ALT SERPL W/O P-5'-P-CCNC: 14 U/L (ref 10–44)
ANION GAP SERPL CALC-SCNC: 8 MMOL/L (ref 8–16)
AST SERPL-CCNC: 17 U/L (ref 10–40)
BILIRUB SERPL-MCNC: 0.5 MG/DL (ref 0.1–1)
BUN SERPL-MCNC: 15 MG/DL (ref 8–23)
CALCIUM SERPL-MCNC: 9.4 MG/DL (ref 8.7–10.5)
CHLORIDE SERPL-SCNC: 108 MMOL/L (ref 95–110)
CO2 SERPL-SCNC: 23 MMOL/L (ref 23–29)
CREAT SERPL-MCNC: 0.7 MG/DL (ref 0.5–1.4)
EST. GFR  (NO RACE VARIABLE): >60 ML/MIN/1.73 M^2
GLUCOSE SERPL-MCNC: 100 MG/DL (ref 70–110)
IRON SERPL-MCNC: 64 UG/DL (ref 30–160)
POTASSIUM SERPL-SCNC: 4.2 MMOL/L (ref 3.5–5.1)
PROT SERPL-MCNC: 7.5 G/DL (ref 6–8.4)
SATURATED IRON: 15 % (ref 20–50)
SODIUM SERPL-SCNC: 139 MMOL/L (ref 136–145)
TOTAL IRON BINDING CAPACITY: 417 UG/DL (ref 250–450)
TRANSFERRIN SERPL-MCNC: 282 MG/DL (ref 200–375)

## 2023-11-20 PROCEDURE — 83540 ASSAY OF IRON: CPT | Performed by: FAMILY MEDICINE

## 2023-11-20 PROCEDURE — 84466 ASSAY OF TRANSFERRIN: CPT | Performed by: FAMILY MEDICINE

## 2023-11-20 PROCEDURE — 80053 COMPREHEN METABOLIC PANEL: CPT | Performed by: FAMILY MEDICINE

## 2023-11-20 PROCEDURE — 36415 COLL VENOUS BLD VENIPUNCTURE: CPT | Mod: PO | Performed by: FAMILY MEDICINE

## 2023-11-20 RX ORDER — METHOCARBAMOL 750 MG/1
TABLET, FILM COATED ORAL
Qty: 40 TABLET | Refills: 0 | Status: SHIPPED | OUTPATIENT
Start: 2023-11-20

## 2023-12-05 ENCOUNTER — OFFICE VISIT (OUTPATIENT)
Dept: ORTHOPEDICS | Facility: CLINIC | Age: 68
End: 2023-12-05
Payer: COMMERCIAL

## 2023-12-05 VITALS — WEIGHT: 117.75 LBS | HEIGHT: 60 IN | BODY MASS INDEX: 23.12 KG/M2

## 2023-12-05 DIAGNOSIS — Z96.652 STATUS POST TOTAL LEFT KNEE REPLACEMENT: Primary | ICD-10-CM

## 2023-12-05 PROCEDURE — 99999 PR PBB SHADOW E&M-EST. PATIENT-LVL III: ICD-10-PCS | Mod: PBBFAC,COE,, | Performed by: ORTHOPAEDIC SURGERY

## 2023-12-05 PROCEDURE — 99024 POSTOP FOLLOW-UP VISIT: CPT | Mod: S$GLB,COE,, | Performed by: ORTHOPAEDIC SURGERY

## 2023-12-05 PROCEDURE — 99999 PR PBB SHADOW E&M-EST. PATIENT-LVL III: CPT | Mod: PBBFAC,COE,, | Performed by: ORTHOPAEDIC SURGERY

## 2023-12-05 PROCEDURE — 99024 PR POST-OP FOLLOW-UP VISIT: ICD-10-PCS | Mod: S$GLB,COE,, | Performed by: ORTHOPAEDIC SURGERY

## 2023-12-09 NOTE — PROGRESS NOTES
Subjective:      Patient ID: Watson Castro is a 68 y.o. female.    Chief Complaint:   Post-op Evaluation    HPI  She returns about 16 weeks out from left TKA.  Her pain ranges only up to 3/10, and she is pleased with her functional progress thus far.  No new symptoms to report        Objective:      Objective   Ortho/SPM Exam     On exam, the scar is well healed without redness or tenderness.  There is no effusion.  Active range of motion is 0-115° without crepitus.  No instability to varus/valgus stress in extension or flexion.  No excessive sagittal plane instability.  Calves soft, nontender.  Distal neurovascular intact.           IMAGING:  None today  Assessment:   Progressing well status post left TKA        Plan:   Anniversary follow-up with x-rays.  We discussed her return to work and arrangements for this.     The patient's pathophysiology was explained in detail with reference to x-rays, models, other visual aids as appropriate.  Treatment options were discussed in detail.  Questions were invited and answered to the patient's satisfaction.      Iam Harris MD  Orthopedic Surgery

## 2023-12-11 ENCOUNTER — LAB VISIT (OUTPATIENT)
Dept: LAB | Facility: HOSPITAL | Age: 68
End: 2023-12-11
Attending: INTERNAL MEDICINE
Payer: COMMERCIAL

## 2023-12-11 DIAGNOSIS — I65.22 OCCLUSION OF LEFT CAROTID ARTERY: ICD-10-CM

## 2023-12-11 LAB
CHOLEST SERPL-MCNC: 115 MG/DL (ref 120–199)
CHOLEST/HDLC SERPL: 2.3 {RATIO} (ref 2–5)
HDLC SERPL-MCNC: 49 MG/DL (ref 40–75)
HDLC SERPL: 42.6 % (ref 20–50)
LDLC SERPL CALC-MCNC: 43.2 MG/DL (ref 63–159)
NONHDLC SERPL-MCNC: 66 MG/DL
TRIGL SERPL-MCNC: 114 MG/DL (ref 30–150)

## 2023-12-11 PROCEDURE — 36415 COLL VENOUS BLD VENIPUNCTURE: CPT | Performed by: INTERNAL MEDICINE

## 2023-12-11 PROCEDURE — 82172 ASSAY OF APOLIPOPROTEIN: CPT | Performed by: INTERNAL MEDICINE

## 2023-12-11 PROCEDURE — 83695 ASSAY OF LIPOPROTEIN(A): CPT | Performed by: INTERNAL MEDICINE

## 2023-12-11 PROCEDURE — 80061 LIPID PANEL: CPT | Performed by: INTERNAL MEDICINE

## 2023-12-13 LAB — APO B SERPL-MCNC: 53 MG/DL

## 2023-12-14 LAB — LPA SERPL-MCNC: 68 MG/DL (ref 0–30)

## 2024-01-11 ENCOUNTER — OFFICE VISIT (OUTPATIENT)
Dept: CARDIOLOGY | Facility: CLINIC | Age: 69
End: 2024-01-11
Payer: COMMERCIAL

## 2024-01-11 VITALS
DIASTOLIC BLOOD PRESSURE: 87 MMHG | WEIGHT: 120.56 LBS | BODY MASS INDEX: 23.55 KG/M2 | SYSTOLIC BLOOD PRESSURE: 126 MMHG | HEART RATE: 105 BPM

## 2024-01-11 DIAGNOSIS — I65.22 OCCLUSION OF LEFT CAROTID ARTERY: Primary | ICD-10-CM

## 2024-01-11 DIAGNOSIS — E78.2 MIXED HYPERLIPIDEMIA: ICD-10-CM

## 2024-01-11 DIAGNOSIS — R09.89 BRUIT OF RIGHT CAROTID ARTERY: ICD-10-CM

## 2024-01-11 DIAGNOSIS — E78.41 ELEVATED LIPOPROTEIN(A): ICD-10-CM

## 2024-01-11 PROCEDURE — 99214 OFFICE O/P EST MOD 30 MIN: CPT | Mod: S$GLB,,, | Performed by: INTERNAL MEDICINE

## 2024-01-11 PROCEDURE — 99999 PR PBB SHADOW E&M-EST. PATIENT-LVL III: CPT | Mod: PBBFAC,COE,, | Performed by: INTERNAL MEDICINE

## 2024-01-11 RX ORDER — EZETIMIBE 10 MG/1
10 TABLET ORAL DAILY
Qty: 90 TABLET | Refills: 3 | Status: SHIPPED | OUTPATIENT
Start: 2024-01-11 | End: 2025-01-10

## 2024-01-11 NOTE — PROGRESS NOTES
Subjective:   01/11/2024     Patient ID:  Watson Castro is a 68 y.o. female who presents for evaulation of Results and Follow-up       Comes in today for cardiac evaluation, no chest pains, no tightness, she does have known carotid disease, occluded left carotid.  A nuclear stress test did not show evidence for ischemia.  A systolic murmurs noted, echocardiography showed no significant valvulopathy.      She returns today for follow-up, laboratory studies show her LDL to be fairly well controlled, but she does have a elevation of lipoprotein small a, no family history of coronary artery disease though.    Prior cardiology note reviewed:  Patient with no exertional chest pains or tightness, but physically limited by knee pain, knee replacement scheduled.  She does not have a history of heart problems, no family history of heart problems.  She does not have hypertension or diabetes.  A CT scan of the chest done last year showed incidental finding of occluded left carotid.  Carotid ultrasound shows an occluded left carotid.  This has been asymptomatic, no unilateral weakness numbness or tingling.    Family history is negative for coronary atherosclerosis doses.  No personal history of hypertension or diabetes.    Addendum 09/12/2023:   ·  Normal myocardial perfusion scan. There is no evidence of myocardial ischemia or infarction.  ·  The LVEF is not accurate due to poor software boundary tracking at rest  and poor software boundary tracking during stress. The visually estimated ejection fraction is normal at rest and normal during stress.  ·  There is normal wall motion at rest and post stress.  ·  LV cavity size is normal at rest and normal at stress.  ·  The ECG portion of the study is abnormal but not diagnostic due to resting ST-T abnormalities.  ·  The patient reported no chest pain during the stress test.  ·  There were no arrhythmias during stress.  ·  There are no prior studies for comparison.    Patient  cleared for surgery.              Past Medical History:   Diagnosis Date    Iron deficiency anemia        Review of patient's allergies indicates:   Allergen Reactions    Sesame seed Anaphylaxis     Rash    Sulfa (sulfonamide antibiotics) Rash         Current Outpatient Medications:     acetaminophen (TYLENOL) 650 MG TbSR, Take 1 tablet (650 mg total) by mouth every 8 (eight) hours., Disp: 120 tablet, Rfl: 0    aspirin (ECOTRIN) 81 MG EC tablet, Take 1 tablet (81 mg total) by mouth once daily., Disp: , Rfl: 0    celecoxib (CELEBREX) 200 MG capsule, Take 1 capsule by mouth once daily, Disp: 30 capsule, Rfl: 0    mecobalamin-levomefolate calc (EB-P1 DR) 0.4 mg- 15 mg CpDR, Take 1 capsule by mouth once daily., Disp: 90 capsule, Rfl: 4    methocarbamoL (ROBAXIN) 750 MG Tab, TAKE 1 TABLET BY MOUTH 4 TIMES DAILY AS NEEDED FOR MUSCLE SPASM, Disp: 40 tablet, Rfl: 0    oxyCODONE (ROXICODONE) 5 MG immediate release tablet, May take 1 tablet every 6 hours as needed for pain, Disp: 28 tablet, Rfl: 0    rosuvastatin (CRESTOR) 20 MG tablet, Take 1 tablet (20 mg total) by mouth every evening., Disp: 90 tablet, Rfl: 4    ezetimibe (ZETIA) 10 mg tablet, Take 1 tablet (10 mg total) by mouth once daily., Disp: 90 tablet, Rfl: 3     Objective:   Review of Systems   Cardiovascular:  Negative for chest pain, claudication, cyanosis, dyspnea on exertion, irregular heartbeat, leg swelling, near-syncope, orthopnea, palpitations, paroxysmal nocturnal dyspnea and syncope.         Vitals:    01/11/24 0918   BP: (!) 145/91   Pulse: 105       Wt Readings from Last 3 Encounters:   01/11/24 54.7 kg (120 lb 9.5 oz)   12/05/23 53.4 kg (117 lb 11.6 oz)   11/27/23 54.1 kg (119 lb 4.8 oz)     Temp Readings from Last 3 Encounters:   11/27/23 98.2 °F (36.8 °C)   09/14/23 98.5 °F (36.9 °C) (Temporal)   08/31/23 97.8 °F (36.6 °C) (Oral)     BP Readings from Last 3 Encounters:   01/11/24 (!) 145/91   11/27/23 (!) 150/62   09/14/23 119/69     Pulse Readings  from Last 3 Encounters:   01/11/24 105   11/27/23 103   09/14/23 103             Physical Exam  Vitals reviewed.   Constitutional:       General: She is not in acute distress.     Appearance: She is well-developed.   HENT:      Head: Normocephalic and atraumatic.      Nose: Nose normal.   Eyes:      Conjunctiva/sclera: Conjunctivae normal.      Pupils: Pupils are equal, round, and reactive to light.   Neck:      Vascular: Carotid bruit (right greater than left) present. No JVD.   Cardiovascular:      Rate and Rhythm: Normal rate and regular rhythm.      Pulses: Normal pulses and intact distal pulses.      Heart sounds: Normal heart sounds. No murmur heard.     No friction rub. No gallop.   Pulmonary:      Effort: Pulmonary effort is normal. No respiratory distress.      Breath sounds: Normal breath sounds. No wheezing or rales.   Chest:      Chest wall: No tenderness.   Abdominal:      General: Bowel sounds are normal. There is no distension.      Palpations: Abdomen is soft.      Tenderness: There is no abdominal tenderness.   Musculoskeletal:         General: No tenderness or deformity. Normal range of motion.      Cervical back: Normal range of motion and neck supple.      Right lower leg: No edema.      Left lower leg: No edema.   Skin:     General: Skin is warm and dry.      Findings: No erythema or rash.   Neurological:      Mental Status: She is alert and oriented to person, place, and time.      Cranial Nerves: No cranial nerve deficit.      Motor: No abnormal muscle tone.      Coordination: Coordination normal.   Psychiatric:         Behavior: Behavior normal.         Thought Content: Thought content normal.         Judgment: Judgment normal.           Lab Results   Component Value Date    CHOL 115 (L) 12/11/2023    CHOL 223 (H) 11/18/2022    CHOL 197 11/05/2021     Lab Results   Component Value Date    HDL 49 12/11/2023    HDL 49 11/18/2022    HDL 46 11/05/2021     Lab Results   Component Value Date     LDLCALC 43.2 (L) 12/11/2023    LDLCALC 139.4 11/18/2022    LDLCALC 109.4 11/05/2021     Lab Results   Component Value Date    ALT 14 11/20/2023    AST 17 11/20/2023    AST 17 08/31/2023    AST 19 11/18/2022     Lab Results   Component Value Date    CREATININE 0.7 11/20/2023    BUN 15 11/20/2023     11/20/2023    K 4.2 11/20/2023    CO2 23 11/20/2023    CO2 25 08/31/2023    CO2 25 11/18/2022     Lab Results   Component Value Date    HGB 12.7 08/31/2023    HCT 39.7 08/31/2023    HCT 41.6 11/18/2022    HCT 37.5 11/05/2021                       Assessment and Plan:     Occlusion of left carotid artery  Comments:  Asymptomatic    Mixed hyperlipidemia  Comments:  would like to obtain LDL cholesterol of 30, add ezetimibe to high-dose statin  Orders:  -     ezetimibe (ZETIA) 10 mg tablet; Take 1 tablet (10 mg total) by mouth once daily.  Dispense: 90 tablet; Refill: 3    Bruit of right carotid artery    Elevated lipoprotein(a)  Comments:  no specific treatment for  Orders:  -     ezetimibe (ZETIA) 10 mg tablet; Take 1 tablet (10 mg total) by mouth once daily.  Dispense: 90 tablet; Refill: 3           Follow up in about 1 year (around 1/11/2025).          Future Appointments   Date Time Provider Department Center   12/2/2024  8:30 AM Americo Cheung,  [facility]

## 2024-02-26 PROBLEM — Z00.00 ANNUAL PHYSICAL EXAM: Status: RESOLVED | Noted: 2021-11-12 | Resolved: 2024-02-26

## 2024-11-22 ENCOUNTER — LAB VISIT (OUTPATIENT)
Dept: LAB | Facility: HOSPITAL | Age: 69
End: 2024-11-22
Payer: COMMERCIAL

## 2024-11-22 DIAGNOSIS — Z79.899 ENCOUNTER FOR LONG-TERM (CURRENT) USE OF MEDICATIONS: ICD-10-CM

## 2024-11-22 DIAGNOSIS — D50.9 IRON DEFICIENCY ANEMIA, UNSPECIFIED IRON DEFICIENCY ANEMIA TYPE: ICD-10-CM

## 2024-11-22 DIAGNOSIS — E78.2 MIXED HYPERLIPIDEMIA: ICD-10-CM

## 2024-11-22 LAB
ALBUMIN SERPL BCP-MCNC: 3.8 G/DL (ref 3.5–5.2)
ALP SERPL-CCNC: 93 U/L (ref 40–150)
ALT SERPL W/O P-5'-P-CCNC: 25 U/L (ref 10–44)
ANION GAP SERPL CALC-SCNC: 8 MMOL/L (ref 8–16)
AST SERPL-CCNC: 24 U/L (ref 10–40)
BASOPHILS # BLD AUTO: 0.03 K/UL (ref 0–0.2)
BASOPHILS NFR BLD: 0.5 % (ref 0–1.9)
BILIRUB SERPL-MCNC: 0.8 MG/DL (ref 0.1–1)
BUN SERPL-MCNC: 15 MG/DL (ref 8–23)
CALCIUM SERPL-MCNC: 9.2 MG/DL (ref 8.7–10.5)
CHLORIDE SERPL-SCNC: 109 MMOL/L (ref 95–110)
CHOLEST SERPL-MCNC: 117 MG/DL (ref 120–199)
CHOLEST/HDLC SERPL: 2.1 {RATIO} (ref 2–5)
CO2 SERPL-SCNC: 22 MMOL/L (ref 23–29)
CREAT SERPL-MCNC: 0.7 MG/DL (ref 0.5–1.4)
DIFFERENTIAL METHOD BLD: NORMAL
EOSINOPHIL # BLD AUTO: 0.3 K/UL (ref 0–0.5)
EOSINOPHIL NFR BLD: 4.6 % (ref 0–8)
ERYTHROCYTE [DISTWIDTH] IN BLOOD BY AUTOMATED COUNT: 14.3 % (ref 11.5–14.5)
EST. GFR  (NO RACE VARIABLE): >60 ML/MIN/1.73 M^2
GLUCOSE SERPL-MCNC: 97 MG/DL (ref 70–110)
HCT VFR BLD AUTO: 38.6 % (ref 37–48.5)
HDLC SERPL-MCNC: 56 MG/DL (ref 40–75)
HDLC SERPL: 47.9 % (ref 20–50)
HGB BLD-MCNC: 12.5 G/DL (ref 12–16)
IMM GRANULOCYTES # BLD AUTO: 0.01 K/UL (ref 0–0.04)
IMM GRANULOCYTES NFR BLD AUTO: 0.2 % (ref 0–0.5)
IRON SERPL-MCNC: 72 UG/DL (ref 30–160)
LDLC SERPL CALC-MCNC: 43 MG/DL (ref 63–159)
LYMPHOCYTES # BLD AUTO: 2.9 K/UL (ref 1–4.8)
LYMPHOCYTES NFR BLD: 44.6 % (ref 18–48)
MCH RBC QN AUTO: 27.2 PG (ref 27–31)
MCHC RBC AUTO-ENTMCNC: 32.4 G/DL (ref 32–36)
MCV RBC AUTO: 84 FL (ref 82–98)
MONOCYTES # BLD AUTO: 0.6 K/UL (ref 0.3–1)
MONOCYTES NFR BLD: 9.5 % (ref 4–15)
NEUTROPHILS # BLD AUTO: 2.7 K/UL (ref 1.8–7.7)
NEUTROPHILS NFR BLD: 40.6 % (ref 38–73)
NONHDLC SERPL-MCNC: 61 MG/DL
NRBC BLD-RTO: 0 /100 WBC
PLATELET # BLD AUTO: 253 K/UL (ref 150–450)
PMV BLD AUTO: 11.2 FL (ref 9.2–12.9)
POTASSIUM SERPL-SCNC: 4.1 MMOL/L (ref 3.5–5.1)
PROT SERPL-MCNC: 7.4 G/DL (ref 6–8.4)
RBC # BLD AUTO: 4.6 M/UL (ref 4–5.4)
SATURATED IRON: 17 % (ref 20–50)
SODIUM SERPL-SCNC: 139 MMOL/L (ref 136–145)
TOTAL IRON BINDING CAPACITY: 414 UG/DL (ref 250–450)
TRANSFERRIN SERPL-MCNC: 280 MG/DL (ref 200–375)
TRIGL SERPL-MCNC: 90 MG/DL (ref 30–150)
WBC # BLD AUTO: 6.54 K/UL (ref 3.9–12.7)

## 2024-11-22 PROCEDURE — 83540 ASSAY OF IRON: CPT | Performed by: FAMILY MEDICINE

## 2024-11-22 PROCEDURE — 36415 COLL VENOUS BLD VENIPUNCTURE: CPT | Mod: PO | Performed by: FAMILY MEDICINE

## 2024-11-22 PROCEDURE — 80061 LIPID PANEL: CPT | Performed by: FAMILY MEDICINE

## 2024-11-22 PROCEDURE — 80053 COMPREHEN METABOLIC PANEL: CPT | Performed by: FAMILY MEDICINE

## 2024-11-22 PROCEDURE — 85025 COMPLETE CBC W/AUTO DIFF WBC: CPT | Performed by: FAMILY MEDICINE

## 2024-12-02 PROBLEM — M17.11 PRIMARY OSTEOARTHRITIS OF RIGHT KNEE: Status: ACTIVE | Noted: 2024-12-02

## 2024-12-02 PROBLEM — Z78.0 POSTMENOPAUSE: Status: ACTIVE | Noted: 2024-12-02

## 2024-12-31 ENCOUNTER — TELEPHONE (OUTPATIENT)
Dept: GASTROENTEROLOGY | Facility: CLINIC | Age: 69
End: 2024-12-31
Payer: COMMERCIAL

## 2025-01-01 NOTE — TELEPHONE ENCOUNTER
----- Message from Jason Angeles sent at 12/4/2024  9:44 AM CST -----    ----- Message -----  From: Lisa Cannon  Sent: 12/4/2024   9:28 AM CST  To: Cary PEREZ Staff    Type:  scheduled colonoscopy     Who Called:new pt   Does the patient know what this is regarding?: Z12.11 (ICD-10-CM) - Screen for colon cancer  Would the patient rather a call back or a response via [facility]? Call   Best Call Back Number:[phone]  Additional Information: referral in system

## 2025-01-25 ENCOUNTER — OFFICE VISIT (OUTPATIENT)
Dept: CARDIOLOGY | Facility: CLINIC | Age: 70
End: 2025-01-25
Payer: COMMERCIAL

## 2025-01-25 DIAGNOSIS — I65.22 OCCLUSION OF LEFT CAROTID ARTERY: ICD-10-CM

## 2025-01-25 DIAGNOSIS — E78.2 MIXED HYPERLIPIDEMIA: ICD-10-CM

## 2025-01-25 DIAGNOSIS — R09.89 BRUIT OF RIGHT CAROTID ARTERY: ICD-10-CM

## 2025-01-25 DIAGNOSIS — I51.7 LVH (LEFT VENTRICULAR HYPERTROPHY): Primary | ICD-10-CM

## 2025-01-25 DIAGNOSIS — E78.41 ELEVATED LIPOPROTEIN(A): ICD-10-CM

## 2025-01-25 PROCEDURE — 99999 PR PBB SHADOW E&M-EST. PATIENT-LVL III: CPT | Mod: PBBFAC,COE,, | Performed by: INTERNAL MEDICINE

## 2025-01-25 PROCEDURE — 99214 OFFICE O/P EST MOD 30 MIN: CPT | Mod: 25,S$GLB,, | Performed by: INTERNAL MEDICINE

## 2025-01-25 PROCEDURE — 93000 ELECTROCARDIOGRAM COMPLETE: CPT | Mod: S$GLB,,, | Performed by: INTERNAL MEDICINE

## 2025-01-25 RX ORDER — METOPROLOL SUCCINATE 25 MG/1
25 TABLET, EXTENDED RELEASE ORAL NIGHTLY
Qty: 30 TABLET | Refills: 11 | Status: SHIPPED | OUTPATIENT
Start: 2025-01-25

## 2025-01-25 RX ORDER — LOSARTAN POTASSIUM 25 MG/1
25 TABLET ORAL DAILY
Qty: 30 TABLET | Refills: 11 | Status: SHIPPED | OUTPATIENT
Start: 2025-01-25

## 2025-01-25 NOTE — PROGRESS NOTES
Subjective:   01/25/2025     Patient ID:  Watson Castro is a 69 y.o. female who presents for evaulation of Annual Exam       Comes in today for cardiac evaluation, no chest pains, no tightness, she does have known carotid disease, occluded left carotid.  A nuclear stress test did not show evidence for ischemia.  A systolic murmur noted, echocardiography showed no significant valvulopathy.      She returns today for follow-up, laboratory studies show her LDL to be fairly well controlled, but she does have a elevation of lipoprotein small a, no family history of coronary artery disease though.  Most recent lab in 11/24 shows total cholesterol of 117, HDL 56, LDL 43 and triglycerides 90.  APO B a year ago was less than 60.    EKG today shows sinus rhythm with fairly marked LVH.  She does not have a history of hypertension.  Review of her echocardiogram from 09/23 does show the presence of hypertrophy.    Prior cardiology note reviewed:  Patient with no exertional chest pains or tightness, but physically limited by knee pain, knee replacement scheduled.  She does not have a history of heart problems, no family history of heart problems.  She does not have hypertension or diabetes.  A CT scan of the chest done last year showed incidental finding of occluded left carotid.  Carotid ultrasound shows an occluded left carotid.  This has been asymptomatic, no unilateral weakness numbness or tingling.    Family history is negative for coronary atherosclerosis doses.  No personal history of hypertension or diabetes.    Addendum 09/12/2023:   ·  Normal myocardial perfusion scan. There is no evidence of myocardial ischemia or infarction.  ·  The LVEF is not accurate due to poor software boundary tracking at rest  and poor software boundary tracking during stress. The visually estimated ejection fraction is normal at rest and normal during stress.  ·  There is normal wall motion at rest and post stress.  ·  LV cavity size is  normal at rest and normal at stress.  ·  The ECG portion of the study is abnormal but not diagnostic due to resting ST-T abnormalities.  ·  The patient reported no chest pain during the stress test.  ·  There were no arrhythmias during stress.  ·  There are no prior studies for comparison.    Prior impression reviewed:   Occlusion of left carotid artery  Comments:  Asymptomatic    Mixed hyperlipidemia  Comments:  would like to obtain LDL cholesterol of 30, add ezetimibe to high-dose statin  Orders:  -     ezetimibe (ZETIA) 10 mg tablet; Take 1 tablet (10 mg total) by mouth once daily.  Dispense: 90 tablet; Refill: 3    Bruit of right carotid artery    Elevated lipoprotein(a)  Comments:  no specific treatment for  Orders:  -     ezetimibe (ZETIA) 10 mg tablet; Take 1 tablet (10 mg total) by mouth once daily.  Dispense: 90 tablet; Refill: 3                Past Medical History:   Diagnosis Date    Iron deficiency anemia        Review of patient's allergies indicates:   Allergen Reactions    Sesame seed Anaphylaxis     Rash    Sulfa (sulfonamide antibiotics) Rash         Current Outpatient Medications:     diclofenac sodium (VOLTAREN) 1 % Gel, Apply 2 g topically 3 (three) times daily as needed (right knee pain)., Disp: 100 g, Rfl: 3    ezetimibe (ZETIA) 10 mg tablet, Take 1 tablet (10 mg total) by mouth once daily., Disp: 90 tablet, Rfl: 4    mecobalamin-levomefolate calc (EB-P1 DR) 0.4 mg- 15 mg CpDR, Take 1 capsule by mouth once daily., Disp: 90 capsule, Rfl: 4    multivitamin (THERAGRAN) per tablet, Take 1 tablet by mouth once daily. And K2 100mg CoQ10 200mg Fish oil 1000mg Kaitlynn-C 1000mg Daily Fiber, Disp: , Rfl:     naproxen (NAPROSYN) 500 MG tablet, Take 1 tablet (500 mg total) by mouth daily as needed (right knee pain)., Disp: 90 tablet, Rfl: 1    rosuvastatin (CRESTOR) 20 MG tablet, Take 1 tablet (20 mg total) by mouth every evening. Take 400 mg Coenzyme Q-10 twice daily., Disp: 90 tablet, Rfl: 4    aspirin  (ECOTRIN) 81 MG EC tablet, Take 1 tablet (81 mg total) by mouth once daily. (Patient taking differently: Take 81 mg by mouth 2 (two) times a day.), Disp: , Rfl: 0    losartan (COZAAR) 25 MG tablet, Take 1 tablet (25 mg total) by mouth once daily., Disp: 30 tablet, Rfl: 11    metoprolol succinate (TOPROL-XL) 25 MG 24 hr tablet, Take 1 tablet (25 mg total) by mouth every evening., Disp: 30 tablet, Rfl: 11     Objective:   Review of Systems   Cardiovascular:  Negative for chest pain, claudication, cyanosis, dyspnea on exertion, irregular heartbeat, leg swelling, near-syncope, orthopnea, palpitations, paroxysmal nocturnal dyspnea and syncope.         There were no vitals filed for this visit.      Wt Readings from Last 3 Encounters:   12/02/24 54 kg (119 lb)   12/02/24 54.4 kg (119 lb 14.4 oz)   01/11/24 54.7 kg (120 lb 9.5 oz)     Temp Readings from Last 3 Encounters:   12/02/24 97.8 °F (36.6 °C) (Oral)   11/27/23 98.2 °F (36.8 °C)   09/14/23 98.5 °F (36.9 °C) (Temporal)     BP Readings from Last 3 Encounters:   12/02/24 122/84   01/11/24 126/87   11/27/23 (!) 150/62     Pulse Readings from Last 3 Encounters:   12/02/24 90   01/11/24 105   11/27/23 103             Physical Exam  Vitals reviewed.   Constitutional:       General: She is not in acute distress.     Appearance: She is well-developed.   HENT:      Head: Normocephalic and atraumatic.      Nose: Nose normal.   Eyes:      Conjunctiva/sclera: Conjunctivae normal.      Pupils: Pupils are equal, round, and reactive to light.   Neck:      Vascular: Carotid bruit (right greater than left) present. No JVD.   Cardiovascular:      Rate and Rhythm: Normal rate and regular rhythm.      Pulses: Normal pulses and intact distal pulses.      Heart sounds: Normal heart sounds. No murmur heard.     No friction rub. No gallop.   Pulmonary:      Effort: Pulmonary effort is normal. No respiratory distress.      Breath sounds: Normal breath sounds. No wheezing or rales.   Chest:       Chest wall: No tenderness.   Abdominal:      General: Bowel sounds are normal. There is no distension.      Palpations: Abdomen is soft.      Tenderness: There is no abdominal tenderness.   Musculoskeletal:         General: No tenderness or deformity. Normal range of motion.      Cervical back: Normal range of motion and neck supple.      Right lower leg: No edema.      Left lower leg: No edema.   Skin:     General: Skin is warm and dry.      Findings: No erythema or rash.   Neurological:      Mental Status: She is alert and oriented to person, place, and time.      Cranial Nerves: No cranial nerve deficit.      Motor: No abnormal muscle tone.      Coordination: Coordination normal.   Psychiatric:         Behavior: Behavior normal.         Thought Content: Thought content normal.         Judgment: Judgment normal.           Lab Results   Component Value Date    CHOL 117 (L) 11/22/2024    CHOL 115 (L) 12/11/2023    CHOL 223 (H) 11/18/2022     Lab Results   Component Value Date    HDL 56 11/22/2024    HDL 49 12/11/2023    HDL 49 11/18/2022     Lab Results   Component Value Date    LDLCALC 43.0 (L) 11/22/2024    LDLCALC 43.2 (L) 12/11/2023    LDLCALC 139.4 11/18/2022     Lab Results   Component Value Date    ALT 25 11/22/2024    AST 24 11/22/2024    AST 17 11/20/2023    AST 17 08/31/2023     Lab Results   Component Value Date    CREATININE 0.7 11/22/2024    BUN 15 11/22/2024     11/22/2024    K 4.1 11/22/2024    CO2 22 (L) 11/22/2024    CO2 23 11/20/2023    CO2 25 08/31/2023     Lab Results   Component Value Date    HGB 12.5 11/22/2024    HCT 38.6 11/22/2024    HCT 39.7 08/31/2023    HCT 41.6 11/18/2022             EKG does show significant abnormality, LVH with ST change.  Not significantly changed from prior study.  Degree of LVH seems out of proportion to the extent of hypertension previously noted.          Assessment and Plan:     LVH (left ventricular hypertrophy)  Comments:  Initiate antihypertensive  therapy  Cardiac MRI  Follow-up after  Orders:  -     MRI Card Morph Func W WO Contrast Inc Veloc Flow Map Non Rad XPD; Future; Expected date: 01/25/2025  -     Cardiac MRI Morphology; Future  -     metoprolol succinate (TOPROL-XL) 25 MG 24 hr tablet; Take 1 tablet (25 mg total) by mouth every evening.  Dispense: 30 tablet; Refill: 11  -     losartan (COZAAR) 25 MG tablet; Take 1 tablet (25 mg total) by mouth once daily.  Dispense: 30 tablet; Refill: 11    Bruit of right carotid artery  Comments:  Follow-up carotid ultrasound    Mixed hyperlipidemia  Comments:  Excellent on high-dose statin plus ezetimibe    Occlusion of left carotid artery  -     CV Ultrasound Bilateral Doppler Carotid; Future; Expected date: 01/26/2025    Elevated lipoprotein(a)  Comments:  No specific therapy for             Follow up in about 4 months (around 5/25/2025).          Future Appointments   Date Time Provider Department Center   12/3/2025  8:00 AM Americo Cheung DO [facility]

## 2025-01-28 LAB
OHS QRS DURATION: 86 MS
OHS QTC CALCULATION: 461 MS

## 2025-02-11 ENCOUNTER — HOSPITAL ENCOUNTER (OUTPATIENT)
Dept: CARDIOLOGY | Facility: HOSPITAL | Age: 70
Discharge: HOME | End: 2025-02-11
Attending: INTERNAL MEDICINE
Payer: COMMERCIAL

## 2025-02-11 DIAGNOSIS — I65.22 OCCLUSION OF LEFT CAROTID ARTERY: ICD-10-CM

## 2025-02-11 LAB
LEFT ARM DIASTOLIC BLOOD PRESSURE: 68 MMHG
LEFT ARM SYSTOLIC BLOOD PRESSURE: 118 MMHG
LEFT CBA DIAS: 9 CM/S
LEFT CBA SYS: 40 CM/S
LEFT ECA DIAS: 10 CM/S
LEFT ECA SYS: 40 CM/S
LEFT ICA DIST DIAS: 12 CM/S
LEFT ICA DIST SYS: 30 CM/S
LEFT ICA MID DIAS: 12 CM/S
LEFT ICA MID SYS: 40 CM/S
LEFT ICA PROX DIAS: 22 CM/S
LEFT ICA PROX SYS: 174 CM/S
LEFT VERTEBRAL DIAS: 10 CM/S
LEFT VERTEBRAL SYS: 69 CM/S
OHS CV CAROTID RIGHT ICA EDV HIGHEST: 30
OHS CV CAROTID ULTRASOUND RIGHT ICA/CCA RATIO: 0.92
OHS CV PV CAROTID LEFT HIGHEST ICA: 174
OHS CV PV CAROTID RIGHT HIGHEST CCA: 200
OHS CV PV CAROTID RIGHT HIGHEST ICA: 143
OHS CV US CAROTID LEFT HIGHEST EDV: 22
RIGHT ARM DIASTOLIC BLOOD PRESSURE: 80 MMHG
RIGHT ARM SYSTOLIC BLOOD PRESSURE: 165 MMHG
RIGHT CBA DIAS: 20 CM/S
RIGHT CBA SYS: 111 CM/S
RIGHT CCA DIST DIAS: 23 CM/S
RIGHT CCA DIST SYS: 156 CM/S
RIGHT CCA MID DIAS: 23 CM/S
RIGHT CCA MID SYS: 188 CM/S
RIGHT CCA PROX DIAS: 27 CM/S
RIGHT CCA PROX SYS: 200 CM/S
RIGHT ECA DIAS: 9 CM/S
RIGHT ECA SYS: 111 CM/S
RIGHT ICA DIST DIAS: 30 CM/S
RIGHT ICA DIST SYS: 143 CM/S
RIGHT ICA MID DIAS: 26 CM/S
RIGHT ICA MID SYS: 92 CM/S
RIGHT ICA PROX DIAS: 22 CM/S
RIGHT ICA PROX SYS: 94 CM/S
RIGHT VERTEBRAL DIAS: 15 CM/S
RIGHT VERTEBRAL SYS: 91 CM/S

## 2025-02-11 PROCEDURE — 93880 EXTRACRANIAL BILAT STUDY: CPT

## 2025-02-11 PROCEDURE — 93880 EXTRACRANIAL BILAT STUDY: CPT | Mod: 26,,, | Performed by: INTERNAL MEDICINE

## 2025-03-18 DIAGNOSIS — Z12.11 SCREEN FOR COLON CANCER: Primary | ICD-10-CM

## 2025-03-18 RX ORDER — SODIUM, POTASSIUM,MAG SULFATES 17.5-3.13G
1 SOLUTION, RECONSTITUTED, ORAL ORAL DAILY
Qty: 1 KIT | Refills: 0 | Status: SHIPPED | OUTPATIENT
Start: 2025-03-18 | End: 2025-03-20

## 2025-03-31 ENCOUNTER — TELEPHONE (OUTPATIENT)
Dept: ENDOSCOPY | Facility: HOSPITAL | Age: 70
End: 2025-03-31
Payer: COMMERCIAL

## 2025-04-02 ENCOUNTER — ANESTHESIA EVENT (OUTPATIENT)
Dept: ENDOSCOPY | Facility: HOSPITAL | Age: 70
End: 2025-04-02
Payer: COMMERCIAL

## 2025-04-02 ENCOUNTER — ANESTHESIA (OUTPATIENT)
Dept: ENDOSCOPY | Facility: HOSPITAL | Age: 70
End: 2025-04-02
Payer: COMMERCIAL

## 2025-04-02 ENCOUNTER — HOSPITAL ENCOUNTER (OUTPATIENT)
Facility: HOSPITAL | Age: 70
Discharge: HOME | End: 2025-04-02
Attending: INTERNAL MEDICINE | Admitting: INTERNAL MEDICINE
Payer: COMMERCIAL

## 2025-04-02 VITALS
TEMPERATURE: 97 F | OXYGEN SATURATION: 94 % | WEIGHT: 115 LBS | HEART RATE: 99 BPM | BODY MASS INDEX: 22.58 KG/M2 | SYSTOLIC BLOOD PRESSURE: 133 MMHG | HEIGHT: 60 IN | RESPIRATION RATE: 17 BRPM | DIASTOLIC BLOOD PRESSURE: 56 MMHG

## 2025-04-02 DIAGNOSIS — Z00.00 ANNUAL PHYSICAL EXAM: Primary | ICD-10-CM

## 2025-04-02 DIAGNOSIS — R94.31 EKG ABNORMALITIES: ICD-10-CM

## 2025-04-02 LAB
OHS QRS DURATION: 76 MS
OHS QTC CALCULATION: 489 MS

## 2025-04-02 PROCEDURE — 93010 ELECTROCARDIOGRAM REPORT: CPT | Mod: COE,,, | Performed by: INTERNAL MEDICINE

## 2025-04-02 PROCEDURE — G0121 COLON CA SCRN NOT HI RSK IND: HCPCS | Performed by: INTERNAL MEDICINE

## 2025-04-02 PROCEDURE — 37000008 HC ANESTHESIA 1ST 15 MINUTES: Performed by: INTERNAL MEDICINE

## 2025-04-02 PROCEDURE — 63600175 PHARM REV CODE 636 W HCPCS: Performed by: NURSE ANESTHETIST, CERTIFIED REGISTERED

## 2025-04-02 PROCEDURE — G0121 COLON CA SCRN NOT HI RSK IND: HCPCS | Mod: COE,,, | Performed by: INTERNAL MEDICINE

## 2025-04-02 PROCEDURE — 93005 ELECTROCARDIOGRAM TRACING: CPT

## 2025-04-02 PROCEDURE — 25000003 PHARM REV CODE 250: Performed by: NURSE ANESTHETIST, CERTIFIED REGISTERED

## 2025-04-02 PROCEDURE — 37000009 HC ANESTHESIA EA ADD 15 MINS: Performed by: INTERNAL MEDICINE

## 2025-04-02 RX ORDER — VASOPRESSIN 20 [USP'U]/ML
INJECTION, SOLUTION INTRAMUSCULAR; SUBCUTANEOUS
Status: DISCONTINUED | OUTPATIENT
Start: 2025-04-02 | End: 2025-04-02

## 2025-04-02 RX ORDER — LIDOCAINE HYDROCHLORIDE 20 MG/ML
INJECTION INTRAVENOUS
Status: DISCONTINUED | OUTPATIENT
Start: 2025-04-02 | End: 2025-04-02

## 2025-04-02 RX ORDER — PHENYLEPHRINE HYDROCHLORIDE 10 MG/ML
INJECTION INTRAVENOUS
Status: DISCONTINUED | OUTPATIENT
Start: 2025-04-02 | End: 2025-04-02

## 2025-04-02 RX ORDER — PROPOFOL 10 MG/ML
VIAL (ML) INTRAVENOUS
Status: DISCONTINUED | OUTPATIENT
Start: 2025-04-02 | End: 2025-04-02

## 2025-04-02 RX ORDER — PROPOFOL 10 MG/ML
VIAL (ML) INTRAVENOUS CONTINUOUS PRN
Status: DISCONTINUED | OUTPATIENT
Start: 2025-04-02 | End: 2025-04-02

## 2025-04-02 RX ADMIN — PHENYLEPHRINE HYDROCHLORIDE 200 MCG: 10 INJECTION INTRAVENOUS at 08:04

## 2025-04-02 RX ADMIN — SODIUM CHLORIDE: 0.9 INJECTION, SOLUTION INTRAVENOUS at 07:04

## 2025-04-02 RX ADMIN — PROPOFOL 150 MCG/KG/MIN: 10 INJECTION, EMULSION INTRAVENOUS at 08:04

## 2025-04-02 RX ADMIN — VASOPRESSIN 1 UNITS: 20 INJECTION INTRAVENOUS at 08:04

## 2025-04-02 RX ADMIN — PROPOFOL 70 MG: 10 INJECTION, EMULSION INTRAVENOUS at 08:04

## 2025-04-02 RX ADMIN — LIDOCAINE HYDROCHLORIDE 75 MG: 20 INJECTION, SOLUTION INTRAVENOUS at 08:04

## 2025-04-02 RX ADMIN — PHENYLEPHRINE HYDROCHLORIDE 100 MCG: 10 INJECTION INTRAVENOUS at 08:04

## 2025-04-02 NOTE — ANESTHESIA PREPROCEDURE EVALUATION
04/02/2025  Watson Castro is a 69 y.o., female.    Past Medical History:   Diagnosis Date    Iron deficiency anemia      Past Surgical History:   Procedure Laterality Date    CATARACT EXTRACTION W/  INTRAOCULAR LENS IMPLANT Right 10/11/2022    Procedure: EXTRACTION, CATARACT, WITH IOL INSERTION;  Surgeon: Shai Anaya MD;  Location: [facility];  Service: Ophthalmology;  Laterality: Right;    CATARACT EXTRACTION W/  INTRAOCULAR LENS IMPLANT Left 10/25/2022    Procedure: EXTRACTION, CATARACT, WITH IOL INSERTION;  Surgeon: Shai Anaya MD;  Location: [facility] OR;  Service: Ophthalmology;  Laterality: Left;    TOTAL KNEE ARTHROPLASTY Left 9/13/2023    Procedure: ARTHROPLASTY, KNEE, TOTAL - left - STEWART;  Surgeon: Iam Harris MD;  Location: [facility];  Service: Orthopedics;  Laterality: Left;     Pre-op Assessment       I have reviewed the Medications.     Review of Systems  Anesthesia Hx:             Denies Family Hx of Anesthesia complications.     Musculoskeletal:  Arthritis                   Physical Exam  General: Well nourished    Airway:  Mallampati: II   TM Distance: Normal  Neck ROM: Normal ROM    Dental:  Intact    Chest/Lungs:  Clear to auscultation    Heart:  Rate: Normal  Rhythm: Regular Rhythm        Anesthesia Plan  Type of Anesthesia, risks & benefits discussed:    Anesthesia Type: Gen Natural Airway  Intra-op Monitoring Plan: Standard ASA Monitors  Post Op Pain Control Plan: multimodal analgesia  Informed Consent: Informed consent signed with the Patient and all parties understand the risks and agree with anesthesia plan.  All questions answered.   ASA Score: 2    Ready For Surgery From Anesthesia Perspective.     .

## 2025-04-02 NOTE — H&P
Short Stay Endoscopy History and Physical    PCP - Americo Cheung DO  Referring Physician - Bridgette Tejeda MA  No address on file    Procedure - colonoscopy  ASA - per anesthesia  Mallampati - per anesthesia  History of Anesthesia problems - no  Family history Anesthesia problems -  no   Plan of anesthesia - General    HPI:  This is a 69 y.o. female here for evaluation of: screening    Reflux - no  Dysphagia - no  Abdominal pain - no  Diarrhea - no    ROS:  Constitutional: No fevers, chills, No weight loss  CV: No chest pain  Pulm: No cough, No shortness of breath  Ophtho: No vision changes  GI: see HPI  Derm: No rash    Medical History:  has a past medical history of Iron deficiency anemia.    Surgical History:  has a past surgical history that includes Cataract extraction w/  intraocular lens implant (Right, 10/11/2022); Cataract extraction w/  intraocular lens implant (Left, 10/25/2022); and Total knee arthroplasty (Left, 9/13/2023).    Family History: family history is not on file..    Social History:  reports that she has never smoked. She has never used smokeless tobacco. She reports that she does not drink alcohol and does not use drugs.    Review of patient's allergies indicates:   Allergen Reactions    Sesame seed Anaphylaxis     Rash    Sulfa (sulfonamide antibiotics) Rash       Medications:   Prescriptions Prior to Admission[1]    Physical Exam:    Vital Signs:   Vitals:    04/02/25 0750   BP: 100/71   Pulse: 104   Resp: 18   Temp: 97.2 °F (36.2 °C)       General Appearance: Well appearing in no acute distress    Labs:  Lab Results   Component Value Date    WBC 6.54 11/22/2024    HGB 12.5 11/22/2024    HCT 38.6 11/22/2024     11/22/2024    CHOL 117 (L) 11/22/2024    TRIG 90 11/22/2024    HDL 56 11/22/2024    ALT 25 11/22/2024    AST 24 11/22/2024     11/22/2024    K 4.1 11/22/2024     11/22/2024    CREATININE 0.7 11/22/2024    BUN 15 11/22/2024    CO2 22 (L) 11/22/2024    TSH  3.133 08/31/2023    INR 1.0 08/31/2023    HGBA1C 5.7 (H) 08/31/2023       I have explained the risks and benefits of this endoscopic procedure to the patient including but not limited to bleeding, inflammation, infection, perforation, and death.      Macario Cook MD         [1]   Medications Prior to Admission   Medication Sig Dispense Refill Last Dose/Taking    aspirin (ECOTRIN) 81 MG EC tablet Take 1 tablet (81 mg total) by mouth once daily. (Patient taking differently: Take 81 mg by mouth 2 (two) times a day.)  0 Past Week    ezetimibe (ZETIA) 10 mg tablet Take 1 tablet (10 mg total) by mouth once daily. 90 tablet 4 Past Week    losartan (COZAAR) 25 MG tablet Take 1 tablet (25 mg total) by mouth once daily. 30 tablet 11 Past Week    metoprolol succinate (TOPROL-XL) 25 MG 24 hr tablet Take 1 tablet (25 mg total) by mouth every evening. 30 tablet 11 Past Week    multivitamin (THERAGRAN) per tablet Take 1 tablet by mouth once daily. And K2 100mg  CoQ10 200mg  Fish oil 1000mg  Kaitlynn-C 1000mg  Daily Fiber   Past Week    naproxen (NAPROSYN) 500 MG tablet Take 1 tablet (500 mg total) by mouth daily as needed (right knee pain). 90 tablet 1 Past Week    rosuvastatin (CRESTOR) 20 MG tablet Take 1 tablet (20 mg total) by mouth every evening. Take 400 mg Coenzyme Q-10 twice daily. 90 tablet 4 Past Week    diclofenac sodium (VOLTAREN) 1 % Gel Apply 2 g topically 3 (three) times daily as needed (right knee pain). 100 g 3     mecobalamin-levomefolate calc (EB-P1 DR) 0.4 mg- 15 mg CpDR Take 1 capsule by mouth once daily. 90 capsule 4

## 2025-04-02 NOTE — ANESTHESIA POSTPROCEDURE EVALUATION
Anesthesia Post Evaluation    Patient: Watson Castro    Procedure(s) Performed: Procedure(s) (LRB):  COLONOSCOPY (N/A)    Final Anesthesia Type: general      Patient location during evaluation: GI PACU  Patient participation: Yes- Able to Participate  Level of consciousness: awake and alert  Post-procedure vital signs: reviewed and stable  Pain management: adequate  Airway patency: patent    PONV status at discharge: No PONV  Anesthetic complications: no    Karin-operative Events Comments: Pt with some hypotension in PACU. She was sleepy but arousble. VS otherwise stable. Pt given fluids and pressors. A 12 lead EKG done and it showed significant changes from previous EKG.   Later, when pt was more awake and alert, I explained what happened and told her that she should definitely f/u with her cardiologist.  Cardiovascular status: blood pressure returned to baseline  Respiratory status: unassisted  Hydration status: euvolemic  Follow-up not needed.            Vitals Value Taken Time   BP 95/51 04/02/25 09:16   Temp 37 04/02/25 10:19   Pulse 92 04/02/25 09:16   Resp 16 04/02/25 09:16   SpO2 92 % 04/02/25 09:16         No case tracking events are documented in the log.      Pain/Marlene Score: Marlene Score: 9 (4/2/2025  9:16 AM)

## 2025-04-02 NOTE — [employer]ATION PATIENT INSTRUCTIONS
Discharge Summary/Instructions after an Endoscopic Procedure  Patient Name: Watson Castro  Patient MRN: [id number]  Patient YOB: 1955 Wednesday, April 2, 2025  Macario Cook MD  Dear patient,  As a result of recent federal legislation (The Federal Cures Act), you may   receive lab or pathology results from your procedure in your [facility]   account before your physician is able to contact you. Your physician or   their representative will relay the results to you with their   recommendations at their soonest availability.  Thank you,  Your health is very important to us during the Covid Crisis. Following your   procedure today, you will receive a daily text for 2 weeks asking about   signs or symptoms of Covid 19.  Please respond to this text when you   receive it so we can follow up and keep you as safe as possible.   RESTRICTIONS:  During your procedure today, you received medications for sedation.  These   medications may affect your judgment, balance and coordination.  Therefore,   for 24 hours, you have the following restrictions:   - DO NOT drive a car, operate machinery, make legal/financial decisions,   sign important papers or drink alcohol.    ACTIVITY:  Today: no heavy lifting, straining or running due to procedural   sedation/anesthesia.  The following day: return to full activity including work.  DIET:  Eat and drink normally unless instructed otherwise.     TREATMENT FOR COMMON SIDE EFFECTS:  - Mild abdominal pain, nausea, belching, bloating or excessive gas:  rest,   eat lightly and use a heating pad.  - Sore Throat: treat with throat lozenges and/or gargle with warm salt   water.  - Because air was used during the procedure, expelling large amounts of air   from your rectum or belching is normal.  - If a bowel prep was taken, you may not have a bowel movement for 1-3 days.    This is normal.  SYMPTOMS TO WATCH FOR AND REPORT TO YOUR PHYSICIAN:  1. Abdominal pain or bloating, other than  gas cramps.  2. Chest pain.  3. Back pain.  4. Signs of infection such as: chills or fever occurring within 24 hours   after the procedure.  5. Rectal bleeding, which would show as bright red, maroon, or black stools.   (A tablespoon of blood from the rectum is not serious, especially if   hemorrhoids are present.)  6. Vomiting.  7. Weakness or dizziness.  GO DIRECTLY TO THE NEAREST EMERGENCY ROOM IF YOU HAVE ANY OF THE FOLLOWING:      Difficulty breathing              Chills and/or fever over 101 F   Persistent vomiting and/or vomiting blood   Severe abdominal pain   Severe chest pain   Black, tarry stools   Bleeding- more than one tablespoon   Any other symptom or condition that you feel may need urgent attention  Your doctor recommends these additional instructions:  If any biopsies were taken, your doctors clinic will contact you in 1 to 2   weeks with any results.  - Discharge patient to home (ambulatory).   - Patient has a contact number available for emergencies.  The signs and   symptoms of potential delayed complications were discussed with the   patient.  Return to normal activities tomorrow.  Written discharge   instructions were provided to the patient.   - Resume previous diet.   - Continue present medications.   - Return to primary care physician as previously scheduled.   - Repeat colonoscopy in 10 years for screening purposes.   - Return to primary care physician at appointment to be scheduled.  For questions, problems or results please call your physician - Macario Cook MD.  EMERGENCY PHONE NUMBER: [phone],  LAB RESULTS: [phone]  IF A COMPLICATION OR EMERGENCY SITUATION ARISES AND YOU ARE UNABLE TO REACH   YOUR PHYSICIAN - GO DIRECTLY TO THE EMERGENCY ROOM.  Macario Cook MD  4/2/2025 8:33:20 AM  This report has been verified and signed electronically.  Dear patient,  As a result of recent federal legislation (The Federal Cures Act), you may   receive lab or pathology results from  your procedure in your [facility]   account before your physician is able to contact you. Your physician or   their representative will relay the results to you with their   recommendations at their soonest availability.  Thank you,  [employer]ATION

## 2025-04-02 NOTE — TRANSFER OF CARE
Anesthesia Transfer of Care Note    Patient: Watson Castro    Procedure(s) Performed: Procedure(s) (LRB):  COLONOSCOPY (N/A)    Patient location: GI    Anesthesia Type: general    Transport from OR: Transported from OR on room air with adequate spontaneous ventilation    Post pain: adequate analgesia    Post assessment: no apparent anesthetic complications and tolerated procedure well    Post vital signs: stable    Level of consciousness: awake, alert and oriented    Nausea/Vomiting: no nausea/vomiting    Complications: cardiovascular complications, patient having some ST depression on EKG on arrival to PACU and hypotensive like in case; pressors given to increase pressure with varying response; Dr. Hurtado called to bedside; EKG done and compares similarily to previous EKG in Jan. 25 with ST depression    Transfer of care protocol was followed      Last vitals: Visit Vitals  BP (!) 81/52   Pulse 77   Temp 36.2 °C (97.2 °F) (Skin)   Resp (!) 24   Ht 5' (1.524 m)   Wt 52.2 kg (115 lb)   SpO2 (!) 94%   Breastfeeding No   BMI 22.46 kg/m²

## 2025-07-10 ENCOUNTER — TELEPHONE (OUTPATIENT)
Dept: CARDIOLOGY | Facility: HOSPITAL | Age: 70
End: 2025-07-10
Payer: COMMERCIAL

## 2025-07-10 ENCOUNTER — PATIENT MESSAGE (OUTPATIENT)
Dept: CARDIOLOGY | Facility: HOSPITAL | Age: 70
End: 2025-07-10
Payer: COMMERCIAL

## 2025-07-10 ENCOUNTER — OFFICE VISIT (OUTPATIENT)
Dept: CARDIOLOGY | Facility: CLINIC | Age: 70
End: 2025-07-10
Payer: COMMERCIAL

## 2025-07-10 DIAGNOSIS — I51.7 LEFT VENTRICULAR HYPERTROPHY: ICD-10-CM

## 2025-07-10 DIAGNOSIS — I65.22 OCCLUSION OF LEFT CAROTID ARTERY: ICD-10-CM

## 2025-07-10 DIAGNOSIS — E78.41 ELEVATED LIPOPROTEIN(A): ICD-10-CM

## 2025-07-10 DIAGNOSIS — E78.2 MIXED HYPERLIPIDEMIA: Primary | ICD-10-CM

## 2025-07-10 DIAGNOSIS — R09.89 BRUIT OF RIGHT CAROTID ARTERY: ICD-10-CM

## 2025-07-10 PROCEDURE — 99999 PR PBB SHADOW E&M-EST. PATIENT-LVL III: CPT | Mod: PBBFAC,COE,, | Performed by: INTERNAL MEDICINE

## 2025-07-10 PROCEDURE — 1159F MED LIST DOCD IN RCRD: CPT | Mod: CPTII,S$GLB,COE, | Performed by: INTERNAL MEDICINE

## 2025-07-10 PROCEDURE — 4010F ACE/ARB THERAPY RXD/TAKEN: CPT | Mod: CPTII,S$GLB,COE, | Performed by: INTERNAL MEDICINE

## 2025-07-10 PROCEDURE — 1101F PT FALLS ASSESS-DOCD LE1/YR: CPT | Mod: CPTII,S$GLB,COE, | Performed by: INTERNAL MEDICINE

## 2025-07-10 PROCEDURE — 99214 OFFICE O/P EST MOD 30 MIN: CPT | Mod: S$GLB,COE,, | Performed by: INTERNAL MEDICINE

## 2025-07-10 PROCEDURE — G2211 COMPLEX E/M VISIT ADD ON: HCPCS | Mod: S$GLB,COE,, | Performed by: INTERNAL MEDICINE

## 2025-07-10 PROCEDURE — 3288F FALL RISK ASSESSMENT DOCD: CPT | Mod: CPTII,S$GLB,COE, | Performed by: INTERNAL MEDICINE

## 2025-07-10 NOTE — PROGRESS NOTES
Subjective:   07/10/2025     Patient ID:  Watson Castro is a 69 y.o. female who presents for evaulation of Follow-up       History of Present Illness    CHIEF COMPLAINT:  Patient presents for a cardiac follow-up visit.    HPI:  Patient is being seen for a cardiac follow-up, with a known history of carotid disease including an occluded left carotid. She underwent a carotid US on February 25th, which showed bilateral atherosclerosis with no significant right stenosis and an occluded left carotid. She reports feeling well overall, but mentions recent stomach upset. She also reports arthritis in her right knee, affecting her when rising. She mentions having an artery problem, though the specific details are unclear.    She denies chest pain, tightness, squeezing, pressure, or any cardiac problems.    CARDIAC HISTORY:  Carotid disease with occluded left carotid  Echo 2023: no valvulopathy, LVH  Carotid US 02/2025: bilateral atherosclerosis, no significant right stenosis, left carotid occluded Nuclear stress test: no ischemia    MEDICATIONS:  Ezetimibe 10 mg daily  Rosuvastatin 20 mg nightly  Losartan 25 mg daily  Metoprolol succinate 25 mg nightly  Aspirin 81 mg daily    TEST RESULTS:  Patient's recent lab results show a Lipoprotein A level of 68, which is above the upper limit of normal (30). Her APOB level was 53 in December 2023. Her cholesterol levels were excellent in recent labs.    MEDICAL HISTORY:  Patient has a history of hyperlipidemia (HLD), specifically with elevated lipoprotein A. She also has arthritis in her right knee.      ROS:  General: -fever, -chills, -fatigue, -weight gain, -weight loss  Eyes: -vision changes, -redness, -discharge  ENT: -ear pain, -nasal congestion, -sore throat  Cardiovascular: -chest pain, -palpitations, -lower extremity edema  Respiratory: -cough, -shortness of breath  Gastrointestinal: -abdominal pain, -nausea, -vomiting, -diarrhea, -constipation, -blood in stool,  +indigestion  Genitourinary: -dysuria, -hematuria, -frequency  Musculoskeletal: +joint pain, -muscle pain, +pain with movement  Skin: -rash, -lesion  Neurological: -headache, -dizziness, -numbness, -tingling  Psychiatric: -anxiety, -depression, -sleep difficulty          Problem List[1]     Review of patient's allergies indicates:   Allergen Reactions    Sesame seed Anaphylaxis     Rash    Sulfa (sulfonamide antibiotics) Rash       Current Medications[2]     Objective:   Physical Exam    General: No acute distress. Well-developed. Well-nourished.  Eyes: EOMI. Sclerae anicteric.  HENT: Normocephalic. Atraumatic. Nares patent. Moist oral mucosa.  Cardiovascular: Regular rate. Regular rhythm. Grade 1 systolic ejection murmur. No rubs. No gallops. Normal S1, S2.  Respiratory: Normal respiratory effort. Clear to auscultation bilaterally. No rales. No rhonchi. No wheezing.  Musculoskeletal: No  obvious deformity.  Extremities: No lower extremity edema.  Neurological: Alert & oriented x3. No slurred speech. Normal gait.  Psychiatric: Normal mood. Normal affect. Good insight. Good judgment.  Skin: Warm. Dry. No rash.  Neck: Right carotid bruit.          Vitals:    07/10/25 0914   BP: (P) 109/65   Pulse: (P) 68     Wt Readings from Last 3 Encounters:   07/10/25 (P) 54.3 kg (119 lb 11.4 oz)   04/02/25 52.2 kg (115 lb)   12/02/24 54 kg (119 lb)     Temp Readings from Last 3 Encounters:   04/02/25 97.2 °F (36.2 °C) (Skin)   12/02/24 97.8 °F (36.6 °C) (Oral)   11/27/23 98.2 °F (36.8 °C)     BP Readings from Last 3 Encounters:   07/10/25 (P) 109/65   04/02/25 (!) 133/56   12/02/24 122/84     Pulse Readings from Last 3 Encounters:   07/10/25 (P) 68   04/02/25 99   12/02/24 90               Lab Results   Component Value Date    CHOL 117 (L) 11/22/2024    CHOL 115 (L) 12/11/2023    CHOL 223 (H) 11/18/2022     Lab Results   Component Value Date    HDL 56 11/22/2024    HDL 49 12/11/2023    HDL 49 11/18/2022     Lab Results   Component  Value Date    LDLCALC 43.0 (L) 11/22/2024    LDLCALC 43.2 (L) 12/11/2023    LDLCALC 139.4 11/18/2022     Lab Results   Component Value Date    ALT 25 11/22/2024    AST 24 11/22/2024    AST 17 11/20/2023    AST 17 08/31/2023     Lab Results   Component Value Date    CREATININE 0.7 11/22/2024    BUN 15 11/22/2024     11/22/2024    K 4.1 11/22/2024    CO2 22 (L) 11/22/2024    CO2 23 11/20/2023    CO2 25 08/31/2023     Lab Results   Component Value Date    HGB 12.5 11/22/2024    HCT 38.6 11/22/2024    HCT 39.7 08/31/2023    HCT 41.6 11/18/2022       Assessment and Plan:   Assessment & Plan    E78.2 Mixed hyperlipidemia  R09.89 Bruit of right carotid artery  I65.22 Occlusion of left carotid artery  E78.41 Elevated lipoprotein(a)  I51.7 Left ventricular hypertrophy    IMPRESSION:  - Occluded left carotid and no significant right stenosis.  - Previous nuclear stress test showed no ischemia.  - Mild systolic murmur, consistent with Grade 1 systolic ejection murmur on exam.  - LVH based on previous findings.    MIXED HYPERLIPIDEMIA:  - Continued ezetimibe 10 mg daily in the morning.  - Continued rosuvastatin 20 mg nightly.  - Will order labs in 6 months.    BRUIT OF RIGHT CAROTID ARTERY:  - Will order carotid duplex in 6 months.    OCCLUSION OF LEFT CAROTID ARTERY:  - Will order carotid duplex in 6 months.  - Continued aspirin 81 mg daily.    ELEVATED LIPOPROTEIN(A):  - Will order labs in 6 months.    LEFT VENTRICULAR HYPERTROPHY:  - Continued losartan 25 mg daily.  - Continued metoprolol succinate 25 mg nightly.  - Ordered cardiac MRI to assess heart muscle thickness.  - Schedule cardiac MRI at [facility].    FOLLOW-UP:  - Follow up in 6 months.        Patient remains asymptomatic from the carotid standpoint.  Hyperlipidemia appears to be well controlled, I will obtain repeat lab work including a April lipoprotein B. she has a history of left ventricular hypertrophy.  I ordered a cardiac MRI last year, not performed,  will have that done.      Otherwise, she will return in 6 months with a carotid ultrasound for continued cardiac follow-up.      Follow up in about 6 months (around 1/10/2026).        Future Appointments   Date Time Provider Department Center   12/3/2025  8:00 AM Americo Cheung, DO [facility] [place]       This note was generated with the assistance of ambient listening technology. Verbal consent was obtained by the patient and accompanying visitor(s) for the recording of patient appointment to facilitate this note. I attest to having reviewed and edited the generated note for accuracy, though some syntax or spelling errors may persist. Please contact the author of this note for any clarification.                      [1]   Patient Active Problem List  Diagnosis    Annual physical exam    Iron deficiency anemia    Mixed hyperlipidemia    Constipation    Snoring    Bruit of right carotid artery    Occlusion of left carotid artery    Impaired range of motion of left knee    Status post total left knee replacement    Postmenopause    Primary osteoarthritis of right knee    Elevated lipoprotein(a)   [2]   Current Outpatient Medications:     diclofenac sodium (VOLTAREN) 1 % Gel, Apply 2 g topically 3 (three) times daily as needed (right knee pain)., Disp: 100 g, Rfl: 3    ezetimibe (ZETIA) 10 mg tablet, Take 1 tablet (10 mg total) by mouth once daily., Disp: 90 tablet, Rfl: 4    losartan (COZAAR) 25 MG tablet, Take 1 tablet (25 mg total) by mouth once daily., Disp: 30 tablet, Rfl: 11    mecobalamin-levomefolate calc (EB-P1 DR) 0.4 mg- 15 mg CpDR, Take 1 capsule by mouth once daily., Disp: 90 capsule, Rfl: 4    metoprolol succinate (TOPROL-XL) 25 MG 24 hr tablet, Take 1 tablet (25 mg total) by mouth every evening., Disp: 30 tablet, Rfl: 11    multivitamin (THERAGRAN) per tablet, Take 1 tablet by mouth once daily. And K2 100mg CoQ10 200mg Fish oil 1000mg Kaitlynn-C 1000mg Daily Fiber, Disp: , Rfl:     naproxen  (NAPROSYN) 500 MG tablet, TAKE 1 TABLET BY MOUTH ONCE DAILY AS NEEDED FOR  RIGHT  KNEE  PAIN, Disp: 90 tablet, Rfl: 0    rosuvastatin (CRESTOR) 20 MG tablet, Take 1 tablet (20 mg total) by mouth every evening. Take 400 mg Coenzyme Q-10 twice daily., Disp: 90 tablet, Rfl: 4    aspirin (ECOTRIN) 81 MG EC tablet, Take 1 tablet (81 mg total) by mouth once daily. (Patient taking differently: Take 81 mg by mouth 2 (two) times a day.), Disp: , Rfl: 0

## 2025-07-10 NOTE — TELEPHONE ENCOUNTER
I had the pleasure of discussing your upcoming Cardiac MRI scheduled for 09/23/2025 @ 8:00 at [facility] at [address] [address] [address] from the [facility]. Please aim to arrive at least 20-30 minutes before your scheduled time to allow staff time to check you in for the test as well as do their prep for the MRI to ensure this is done safely.    We discussed and ensured that you will arrive for the scheduled appointment and confirmed the absence of a pacemaker/defibrillator, the absence of a cerebral aneurysm or surgical clip, pump, nerve or brain stimulator, middle or inner ear prosthesis or other metal implant or being injured by a metal object (i.e. bullet, bb, shrapnel).    What is it? Imaging to look at your heart and its surrounding structures with and/or without the administration of Gadolinium contrast. Patients with pacemakers previously could not be scanned. However, new pacemakers have been designed to withstand MRI scans safely. Also, it has been determined that many of the older pacemakers can be scanned safely under the proper conditions.     Why are you having this test? An MRI is used to visualize various structures and tissue types within the body. Because it provides a high level of detail, an MRI is preferred for a wide variety of purposes. Your doctor has decided that an MRI is necessary to visualize the pathology present.    NO special preparation is required for this particular MRI. You can eat and take medications as you normally do.    The test should take about 40-50 minutes to complete. It is important to hold still for the exam or the pictures will be unreadable. If you are claustrophobic or have pain issues that may interfere with your ability to stay still, please discuss this with the ordering provider. You may or may not receive IV Gadolinium during the exam if this is indicated, so an IV will be placed. THIS IS NOT A DYE AND DOES NOT CONTAIN  IODINE. Your heart rate, blood pressure, and oxygen saturation will be continuously monitored throughout the exam. Rescue medications will be on hand in the event of any issues.      Thank you again for your time today. I can be reached at [phone], M-F from 7:30-4.

## (undated) DEVICE — TOWEL OR XRAY WHITE 17X26IN

## (undated) DEVICE — DRAPE THREE-QTR REINF 53X77IN

## (undated) DEVICE — SOL IRR STRL WATER 500ML

## (undated) DEVICE — DRAPE T EXTRM SURG 121X128X90

## (undated) DEVICE — Device

## (undated) DEVICE — BANDAGE ESMARK 6X12

## (undated) DEVICE — PADDING CAST 6IN DELTA ROLL

## (undated) DEVICE — SYR 50CC LL

## (undated) DEVICE — NDL HYPO STD REG BVL 18GX1.5IN

## (undated) DEVICE — SOL NACL IRR 3000ML

## (undated) DEVICE — GLOVE SENSICARE PI MICRO 7.5

## (undated) DEVICE — BRUSH SCRUB HIBICLENS 4%

## (undated) DEVICE — HOOD T7 W/ PEEL AWAY LENS

## (undated) DEVICE — SUT MONOCRYL PLUS UD 3-0 27

## (undated) DEVICE — SYR SLIP TIP 1CC

## (undated) DEVICE — BLADE SURG BVL ANG COAX 2.4MM

## (undated) DEVICE — SOL NACL IRR 1000ML BTL

## (undated) DEVICE — BLADE RECIP DS OFST 70X1X12.5

## (undated) DEVICE — SOL BETADINE 5%

## (undated) DEVICE — ELECTRODE BLD 1 INCH TEFLON

## (undated) DEVICE — BANDAGE MATRIX HK LOOP 6IN 5YD

## (undated) DEVICE — DRAPE INCISE IOBAN 2 23X17IN

## (undated) DEVICE — TUBE SUCTION KAMVAC MINI 20/BX

## (undated) DEVICE — ELECTRODE REM PLYHSV RETURN 9

## (undated) DEVICE — WRAP KNEE ACCU THERM GEL PACK

## (undated) DEVICE — GOWN ECLIPSE POLY REINF 3XL

## (undated) DEVICE — GLOVE SENSICARE PI ALOE 8

## (undated) DEVICE — PAD CAST SPECIALIST STRL 6

## (undated) DEVICE — GOWN SMARTGOWN 3XL XLONG

## (undated) DEVICE — DRESSING AQUACEL AG ADV 3.5X12

## (undated) DEVICE — PULSAVAC ZIMMER

## (undated) DEVICE — SOL PVP-I SCRUB 7.5% 4OZ

## (undated) DEVICE — BLADE SAGITTAL 18 X 1.27 X 90M

## (undated) DEVICE — WRAP PROTECTIVE LEG POS STRL

## (undated) DEVICE — GAUZE SPONGE 4X4 12PLY

## (undated) DEVICE — SUT 2/0 36IN COATED VICRYL

## (undated) DEVICE — SHEILD & GARTERS FOX METAL EYE

## (undated) DEVICE — ALCOHOL 70% ANTISEPTIC ISO 4OZ

## (undated) DEVICE — GLOVE BIOGEL SKINSENSE PI 7.5

## (undated) DEVICE — MIXER BONE CEMENT

## (undated) DEVICE — SUT 1 36IN COATED VICRYL UN

## (undated) DEVICE — KIT TOTAL KNEE TKOFG OMC

## (undated) DEVICE — TOURNIQUET SB QC DP 34X4IN

## (undated) DEVICE — ADHESIVE DERMABOND ADVANCED